# Patient Record
Sex: MALE | Race: WHITE | NOT HISPANIC OR LATINO
[De-identification: names, ages, dates, MRNs, and addresses within clinical notes are randomized per-mention and may not be internally consistent; named-entity substitution may affect disease eponyms.]

---

## 2017-02-10 ENCOUNTER — TRANSCRIPTION ENCOUNTER (OUTPATIENT)
Age: 62
End: 2017-02-10

## 2019-10-17 ENCOUNTER — TRANSCRIPTION ENCOUNTER (OUTPATIENT)
Age: 64
End: 2019-10-17

## 2021-08-12 ENCOUNTER — EMERGENCY (EMERGENCY)
Facility: HOSPITAL | Age: 66
LOS: 0 days | Discharge: HOME | End: 2021-08-13
Attending: EMERGENCY MEDICINE | Admitting: EMERGENCY MEDICINE
Payer: MEDICARE

## 2021-08-12 VITALS
OXYGEN SATURATION: 100 % | TEMPERATURE: 99 F | SYSTOLIC BLOOD PRESSURE: 174 MMHG | RESPIRATION RATE: 18 BRPM | HEART RATE: 65 BPM | DIASTOLIC BLOOD PRESSURE: 78 MMHG

## 2021-08-12 VITALS
HEART RATE: 77 BPM | OXYGEN SATURATION: 97 % | DIASTOLIC BLOOD PRESSURE: 77 MMHG | SYSTOLIC BLOOD PRESSURE: 168 MMHG | RESPIRATION RATE: 17 BRPM

## 2021-08-12 DIAGNOSIS — E11.9 TYPE 2 DIABETES MELLITUS WITHOUT COMPLICATIONS: ICD-10-CM

## 2021-08-12 DIAGNOSIS — I10 ESSENTIAL (PRIMARY) HYPERTENSION: ICD-10-CM

## 2021-08-12 DIAGNOSIS — R51.9 HEADACHE, UNSPECIFIED: ICD-10-CM

## 2021-08-12 DIAGNOSIS — R11.0 NAUSEA: ICD-10-CM

## 2021-08-12 DIAGNOSIS — E78.5 HYPERLIPIDEMIA, UNSPECIFIED: ICD-10-CM

## 2021-08-12 DIAGNOSIS — G44.89 OTHER HEADACHE SYNDROME: ICD-10-CM

## 2021-08-12 PROCEDURE — 99284 EMERGENCY DEPT VISIT MOD MDM: CPT

## 2021-08-12 PROCEDURE — 70450 CT HEAD/BRAIN W/O DYE: CPT | Mod: 26,MA

## 2021-08-12 PROCEDURE — 93010 ELECTROCARDIOGRAM REPORT: CPT

## 2021-08-12 RX ORDER — ACETAMINOPHEN 500 MG
975 TABLET ORAL ONCE
Refills: 0 | Status: COMPLETED | OUTPATIENT
Start: 2021-08-12 | End: 2021-08-12

## 2021-08-12 RX ORDER — LOSARTAN POTASSIUM 100 MG/1
50 TABLET, FILM COATED ORAL ONCE
Refills: 0 | Status: COMPLETED | OUTPATIENT
Start: 2021-08-12 | End: 2021-08-12

## 2021-08-12 RX ADMIN — Medication 975 MILLIGRAM(S): at 22:32

## 2021-08-12 RX ADMIN — LOSARTAN POTASSIUM 50 MILLIGRAM(S): 100 TABLET, FILM COATED ORAL at 23:57

## 2021-08-12 NOTE — ED PROVIDER NOTE - NS ED ROS FT
Constitutional: no fever, chills, no recent weight loss, change in appetite or malaise  Eyes: no redness/discharge/pain/vision changes  ENT: no rhinorrhea/ear pain/sore throat  Cardiac: No chest pain, SOB or edema.  Respiratory: No cough or respiratory distress  GI: No nausea, vomiting, diarrhea or abdominal pain.  : No dysuria, frequency, urgency or hematuria  MS: no pain to back or extremities, no loss of ROM, no weakness  Neuro: + Headache No weakness. No LOC.  Skin: No skin rash.  Endocrine: + diabetes.

## 2021-08-12 NOTE — ED PROVIDER NOTE - NSFOLLOWUPINSTRUCTIONS_ED_ALL_ED_FT
Headache    A headache is pain or discomfort felt around the head or neck area. The specific cause of a headache may not be found as there are many types including tension headaches, migraine headaches, and cluster headaches. Watch your condition for any changes. Things you can do to manage your pain include taking over the counter and prescription medications as instructed by your health care provider, lying down in a dark quiet room, limiting stress, getting regular sleep, and refraining from alcohol and tobacco products.    SEEK IMMEDIATE MEDICAL CARE IF YOU EXPERIENCE THE FOLLOWING SYMPTOMS: fever, vomiting, stiff neck, loss of vision, problems with speech, muscle weakness, loss of balance, trouble walking, pass out, or confusion.     Hypertension    Hypertension, commonly called high blood pressure, is when the force of blood pumping through your arteries is too strong. Hypertension forces your heart to work harder to pump blood. Your arteries may become narrow or stiff. Having untreated or uncontrolled hypertension for a long period of time can cause heart attack, stroke, kidney disease, and other problems. If started on a medication, take exactly as prescribed by your health care professional. Maintain a healthy lifestyle and follow up with your primary care physician.    SEEK IMMEDIATE MEDICAL CARE IF YOU HAVE THE FOLLOWING SYMPTOMS: severe headache, confusion, chest pain, abdominal pain, vomiting, or shortness of breath.

## 2021-08-12 NOTE — ED PROVIDER NOTE - PATIENT PORTAL LINK FT
You can access the FollowMyHealth Patient Portal offered by [facility] by registering at the following website: [email or web address]. By joining [facility]’s FollowMyHealth portal, you will also be able to view your health information using other applications (apps) compatible with our system.

## 2021-08-12 NOTE — ED PROVIDER NOTE - CLINICAL SUMMARY MEDICAL DECISION MAKING FREE TEXT BOX
Patient presents with headache and high blood pressure. ct and ekg done. no acute findings. Home BP medication give with improvement in pressure and symptoms. Discharged with pmd follow up and return precautions.

## 2021-08-12 NOTE — ED PROVIDER NOTE - CARE PROVIDER_API CALL
Anni Morgan  INTERNAL MEDICINE  [facility] [place] [address]  Phone: [phone]  Fax: [phone]  Follow Up Time:

## 2021-08-12 NOTE — ED PROVIDER NOTE - ATTENDING CONTRIBUTION TO CARE
67 yo M pmh of HTN, HLD, DM presents with headache and high blood pressure. States that it started around 5pm, gradual onset, pressure like in the front of his head. Went to urgent care center and was found to be hypertensive so was sent ot the ED for further management. no numbness, tingling or weakness. no chest pain, no shortness of breath, no palpitations. Patient missed his pmd dose of losartan. Does report some nausea, no vomiting. pmd is Dr. Navarro.     CONSTITUTIONAL: Well-developed; well-nourished; in no acute distress.   SKIN: warm, dry  HEAD: Normocephalic; atraumatic.  EYES: PERRL, EOMI, no conjunctival erythema  ENT: No nasal discharge; airway clear.  NECK: Supple; non tender.  CARD: S1, S2 normal;  Regular rate and rhythm.   RESP: No wheezes, rales or rhonchi.  ABD: soft non tender, non distended, no rebound or guarding  EXT: Normal ROM.  5/5 strength in all 4 extremities   LYMPH: No acute cervical adenopathy.  NEURO: Alert, oriented, grossly unremarkable. neurovascularly intact  PSYCH: Cooperative, appropriate.

## 2021-08-12 NOTE — ED PROVIDER NOTE - OBJECTIVE STATEMENT
67 yo male hx of HTN/HLD/DM present c/o gradual onset of frontal headache started around 8pm while he was watching TV. headache is mild to moderate and localized to the front of his head. checked his BP at home and it was 200s/100s so he went to outside [facility] for COVID swab and referred to ED for HTN evaluation. denies nausea/vomiting assoc with headache. also reported he hasn't taken his BP med (losartan) tonight. Denies Dizziness/slur speech/extremities weakness and numbness/ facial numbness and weakness. Denies Fever/chill/recent illness/coughing/chest pain/sob/abd pain/n/v/d/extremities pain/urinary sxs.

## 2021-08-12 NOTE — ED ADULT TRIAGE NOTE - BP NONINVASIVE SYSTOLIC (MM HG)
Encounter addended by: Allyssa Martinez OT on: 9/27/2018 11:06 AM<BR>     Actions taken: Flowsheet accepted [phone]

## 2021-08-13 NOTE — ED ADULT NURSE NOTE - TEMPLATE LIST FOR HEAD TO TOE ASSESSMENT
1. Name: Keyur Olivares MRN #: [id number]  2. Date: 5/3/2018  Procedure: left knee arthroscopy, medial meniscus debridement.  3. Discharge to home when stable, tolerating clear liquids, and patient has urinated  4. Call for follow-up appointment, [phone], with Dr. Menchaca in:  2 weeks.   WOUND CARE    The bandage may be slightly bloody. This is normal.  5. Ice:  Keep an ice bag on your knee for 20 minutes at a time.  6. Keep incisions clean and dry following surgery for:  72 hours   7. Change all bandages in:  72 hours       8. If bandages are changed before follow-up, cover all incisions with fresh bandages or bandaids.  9. O.K. to shower (may get incision wet) in:  72 hours  10. No tub baths, swimming pools, hot tubs, etc. for a minimum of 2 weeks following surgery  ACTIVITY  11. Keep leg elevated on a pillow placed under ankle. Do not keep pillow under your knee.  12. Weight-bearing ([place]):  Weight-bear as tolerated       May discontinue crutches in 2-3 days if able to walk without a limp.  13. Bracing: no brace needed unless per comfort.l  14. Range of motion limits: no limit. Work on regaining full range of motion.  15. Exercises:  Perform exercises 3 times a day for a minimum of 25 reps each time (start today or tomorrow):             Quadriceps sets  Calf Pumps Straight leg raises  Heels Slides   16. Start Physical Therapy: will discuss upon return to clinic.  17. OK to drive:  Not for 24 hours    When going back to driving, be sure to test braking/acceleration maneuvers in an empty parking lot before entry into any traffic areas.      ABSOLUTELY NO DRIVING WHILE TAKING NARCOTICS!    DISCHARGE MEDICATIONS:   Aspirin 325 mg, 1 tablet, take once a day for 14 days then stop (to prevent blood clots) (over the counter)  Norco (5/325), 1 to 2 tablets, take every 4 to 6 hours as needed for pain, do not exceed 12 tabs/day  Other: stool softeners  Over the counter ibuprofen as needed, per package  Cardiac instructions    Strong pain medication has been prescribed. Use as directed. Do not combine with alcohol. Be careful as you walk or climb stairs.   DIET:  If no nausea, clear liquids should be taken initially.  Then progress to solid foods when clear liquids are tolerated.   RESPONSE TO SURGERY: It is normal to have pain and swelling in your knee after surgery. It may take 4 weeks or longer for the swelling to go away. It is also common to notice some bruising around the knee, thigh, and calf as the swelling resolves.  EMERGENCY: Call or return for any fevers (temperature greater than 101.5   or sustained fevers greater than 100.5   that haven t resolved within 3 to 4 days following surgery) or chills, increasing pain, swelling, redness, calf pain, drainage (especially if yellow, green, or foul smelling), excessive bleeding), chest pain, shortness of breath:  Phone #: [phone]; If emergency, go to local ER or dial 911.    Dustin Menchaca M.D., M.S.  Dept. of Orthopaedic Surgery  [facility]    5/3/2018        KNEE SURGERY - HOME EXERCISE PROGRAM    All exercises to be performed at least 3 times per day.     Quad Sets    Sit with leg extended    Tighten quad muscles in front of leg, trying to  push back of knee downward    Hold exercise for 10 seconds    Rest 10 seconds between reps    Perform 1 set of 20 reps, 3 times a day     Heel Slides     Lie on back with legs straight    Slide heel to buttocks     Return to start position    Repeat with other leg    Perform 1 rep every 4 seconds    Perform 3 sets of 20 reps, 3 times a day    Rest 1 minute between sets     Ankle Pumps     Lie on back with foot elevated on pillow    Move foot up and down, pumping ankle    Perform 3 sets of 20 reps, 3 times a day    Perform 1 rep every 4 seconds    Rest 1 minute between sets     Straight Leg Raise    Lie on back with uninvolved knee bent    Raise straight leg to thigh level of bend leg    Return to starting  position    Perform 3 sets of 20 reps, 3 times a day    Perform 1 rep every 4 seconds    Rest 1 minute between sets     Tylenol was given at 6:30 am.    No Ibuprofen before 7:45 am.    To contact Dr Menchaca call:  [phone]    [facility]  Same-Day Surgery   Adult Discharge Orders & Instructions   For 24 hours after surgery  1. Get plenty of rest.  A responsible adult must stay with you for at least 24 hours after you leave the hospital.   2. Do not drive or use heavy equipment.  If you have weakness or tingling, don't drive or use heavy equipment until this feeling goes away.  3. Do not drink alcohol.  4. Avoid strenuous or risky activities.  Ask for help when climbing stairs.   5. You may feel lightheaded.  IF so, sit for a few minutes before standing.  Have someone help you get up.   6. If you have nausea (feel sick to your stomach): Drink only clear liquids such as apple juice, ginger ale, broth or 7-Up.  Rest may also help.  Be sure to drink enough fluids.  Move to a regular diet as you feel able.  7. You may have a slight fever. Call the doctor if your fever is over 100 F (37.7 C) (taken under the tongue) or lasts longer than 24 hours.  8. You may have a dry mouth, a sore throat, muscle aches or trouble sleeping.  These should go away after 24 hours.  9. Do not make important or legal decisions.   Call your doctor for any of the followin.  Signs of infection (fever, growing tenderness at the surgery site, a large amount of drainage or bleeding, severe pain, foul-smelling drainage, redness, swelling).    2. It has been over 8 to 10 hours since surgery and you are still not able to urinate (pass water).    3.  Headache for over 24 hours.

## 2022-10-26 PROBLEM — I10 ESSENTIAL (PRIMARY) HYPERTENSION: Chronic | Status: ACTIVE | Noted: 2021-08-13

## 2022-10-26 PROBLEM — E78.5 HYPERLIPIDEMIA, UNSPECIFIED: Chronic | Status: ACTIVE | Noted: 2021-08-13

## 2022-10-26 PROBLEM — E11.9 TYPE 2 DIABETES MELLITUS WITHOUT COMPLICATIONS: Chronic | Status: ACTIVE | Noted: 2021-08-13

## 2022-11-02 ENCOUNTER — OUTPATIENT (OUTPATIENT)
Dept: OUTPATIENT SERVICES | Facility: HOSPITAL | Age: 67
LOS: 1 days | Discharge: HOME | End: 2022-11-02

## 2022-11-02 VITALS
DIASTOLIC BLOOD PRESSURE: 75 MMHG | HEART RATE: 94 BPM | WEIGHT: 315 LBS | OXYGEN SATURATION: 100 % | HEIGHT: 71 IN | SYSTOLIC BLOOD PRESSURE: 154 MMHG | RESPIRATION RATE: 18 BRPM | TEMPERATURE: 98 F

## 2022-11-02 VITALS — RESPIRATION RATE: 15 BRPM | SYSTOLIC BLOOD PRESSURE: 132 MMHG | HEART RATE: 77 BPM | DIASTOLIC BLOOD PRESSURE: 63 MMHG

## 2022-11-02 DIAGNOSIS — Z90.49 ACQUIRED ABSENCE OF OTHER SPECIFIED PARTS OF DIGESTIVE TRACT: Chronic | ICD-10-CM

## 2022-11-02 LAB — GLUCOSE BLDC GLUCOMTR-MCNC: 195 MG/DL — HIGH (ref 70–99)

## 2022-11-02 RX ORDER — FENOFIBRATE,MICRONIZED 130 MG
1 CAPSULE ORAL
Qty: 0 | Refills: 0 | DISCHARGE

## 2022-11-02 RX ORDER — GLIMEPIRIDE 1 MG
1 TABLET ORAL
Qty: 0 | Refills: 0 | DISCHARGE

## 2022-11-02 NOTE — ASU PATIENT PROFILE, ADULT - FALL HARM RISK - HARM RISK INTERVENTIONS

## 2022-11-02 NOTE — PRE-ANESTHESIA EVALUATION ADULT - NSANTHALCOHOLSD_GEN_ALL_CORE
Health Maintenance Due   Topic Date Due   • COVID-19 Vaccine (1) Never done   • Influenza Vaccine (1) 09/01/2021   • Depression Screening  10/06/2021       Patient is due for the topics as listed above and wishes to proceed with them. Orders placed for Immunization(s) Influenza.    Records for the covid shot requested          Yes

## 2022-11-07 DIAGNOSIS — E78.5 HYPERLIPIDEMIA, UNSPECIFIED: ICD-10-CM

## 2022-11-07 DIAGNOSIS — Z98.84 BARIATRIC SURGERY STATUS: ICD-10-CM

## 2022-11-07 DIAGNOSIS — G47.33 OBSTRUCTIVE SLEEP APNEA (ADULT) (PEDIATRIC): ICD-10-CM

## 2022-11-07 DIAGNOSIS — I10 ESSENTIAL (PRIMARY) HYPERTENSION: ICD-10-CM

## 2022-11-07 DIAGNOSIS — E66.9 OBESITY, UNSPECIFIED: ICD-10-CM

## 2022-11-07 DIAGNOSIS — Z90.49 ACQUIRED ABSENCE OF OTHER SPECIFIED PARTS OF DIGESTIVE TRACT: ICD-10-CM

## 2022-11-07 DIAGNOSIS — H26.9 UNSPECIFIED CATARACT: ICD-10-CM

## 2022-11-07 DIAGNOSIS — E11.36 TYPE 2 DIABETES MELLITUS WITH DIABETIC CATARACT: ICD-10-CM

## 2022-11-07 DIAGNOSIS — Z79.85 LONG-TERM (CURRENT) USE OF INJECTABLE NON-INSULIN ANTIDIABETIC DRUGS: ICD-10-CM

## 2024-04-25 ENCOUNTER — APPOINTMENT (OUTPATIENT)
Dept: ORTHOPEDIC SURGERY | Facility: CLINIC | Age: 69
End: 2024-04-25
Payer: COMMERCIAL

## 2024-04-25 DIAGNOSIS — M17.0 BILATERAL PRIMARY OSTEOARTHRITIS OF KNEE: ICD-10-CM

## 2024-04-25 DIAGNOSIS — M25.50 PAIN IN UNSPECIFIED JOINT: ICD-10-CM

## 2024-04-25 PROCEDURE — 73560 X-RAY EXAM OF KNEE 1 OR 2: CPT | Mod: 50

## 2024-04-25 PROCEDURE — 99203 OFFICE O/P NEW LOW 30 MIN: CPT | Mod: 25

## 2024-04-25 PROCEDURE — 20610 DRAIN/INJ JOINT/BURSA W/O US: CPT | Mod: RT

## 2024-04-25 NOTE — HISTORY OF PRESENT ILLNESS
[de-identified] : 69-year-old gentleman presents his office for severe bilateral knee arthritis pain.  He has had pain for years.  He walks with a cane and is limited to one half blocks.  He has had hyaluronic acid injections which have not meaningfully helped his pain.  He takes 800 mg ibuprofen twice daily which mitigates but does not alleviate the pain.  No history of trauma he has lived a rigorous athletic lifestyle in the past.  He admits that he is a little overweight.  His major complaint is pain not instability.  Past medical history: Hyperlipidemia Non-insulin-dependent diabetes last hemoglobin A1c reportedly 7 Hypertension Heart murmur  Medications: Losartan Amlodipine Pravastatin Glimepiride Fenofibrate Trulicity  NKDA  Social: Retired M[employer] , walks with cane, , no cigarettes, social EtOH

## 2024-04-25 NOTE — ASSESSMENT
[FreeTextEntry1] : Non-insulin-dependent diabetic well-controlled diabetes overweight/obese (BMI calculated 47 with reported height of 511 and weight of 340 pounds) with moderate to severe bilateral knee arthritis about the medial and anterior compartments of the knee.  I had a long discussion about treatment options.  I do think he would benefit from physical therapy possibly a cortisone injection and some weight loss.  I have strongly suggested that he follow-up with the bariatric service.  I would be unwilling to do knee replacement on this patient until his BMI is below 40.  Will see him back in the office in 3 to 6 months time to see if he has made improvements in his weight loss and whether therapy and the injection of help with his pain.  He is amenable to these recommendations.  All questions answered to his satisfaction.  He would like to proceed with a cortisone injection is more severe right knee.  Procedure: With the patient's consent and at his request utilizing standard sterile technique patient was provided an injection of lidocaine 1% (4 cc), Marcaine 0.25% (4 cc) and Depo-Medrol 5 mcg/cc (2 cc) into the right knee through a anterior medial portal.  Patient injection without issue.  Postinjection precautions were discussed.

## 2024-04-25 NOTE — IMAGING
[de-identified] : Overweight gentleman walks into my office with mild antalgia.  He is able to get onto and off of the exam table without assistance but uncomfortably.  He is an antalgic gait.  Physical examination: Bilateral hips: Habitus obscures exam somewhat.  No discrete lymph nodes or masses inguinal crease.  No tenderness of the trochanteric bursa.  No pain with rotation hip joint full extension 9 degrees of flexion.  Bilateral knees: Again habitus obscures examination.  Tenderness is primarily along the medial joint line.  Somewhat abnormal patellofemoral grind test.  Synovial thickening.  No gross effusion.  Knee motion restricted to 0-90 degrees.  No varus valgus instability.  Negative Lachman test.  No thrust.  Calf soft no cords.  Bilateral weightbearing knees (AP, lateral): Advanced arthritic changes with loss of the medial joint space.  Marginal osteophytes are noted laterally, posteriorly and anteriorly about the patellofemoral joint to a lesser extent medially.  Several loose bodies are noted.

## 2024-10-31 ENCOUNTER — APPOINTMENT (OUTPATIENT)
Dept: ORTHOPEDIC SURGERY | Facility: CLINIC | Age: 69
End: 2024-10-31
Payer: MEDICARE

## 2024-10-31 DIAGNOSIS — M17.0 BILATERAL PRIMARY OSTEOARTHRITIS OF KNEE: ICD-10-CM

## 2024-10-31 PROCEDURE — 20610 DRAIN/INJ JOINT/BURSA W/O US: CPT | Mod: 50

## 2024-10-31 PROCEDURE — 99203 OFFICE O/P NEW LOW 30 MIN: CPT | Mod: 25

## 2024-11-22 ENCOUNTER — NON-APPOINTMENT (OUTPATIENT)
Age: 69
End: 2024-11-22

## 2024-11-22 ENCOUNTER — APPOINTMENT (OUTPATIENT)
Facility: CLINIC | Age: 69
End: 2024-11-22

## 2024-11-22 PROCEDURE — 99204 OFFICE O/P NEW MOD 45 MIN: CPT

## 2024-11-22 PROCEDURE — 92134 CPTRZ OPH DX IMG PST SGM RTA: CPT

## 2024-11-26 ENCOUNTER — APPOINTMENT (OUTPATIENT)
Dept: PULMONOLOGY | Facility: CLINIC | Age: 69
End: 2024-11-26

## 2024-11-30 ENCOUNTER — INPATIENT (INPATIENT)
Facility: HOSPITAL | Age: 69
LOS: 2 days | Discharge: HOME | DRG: 69 | End: 2024-12-03
Attending: INTERNAL MEDICINE | Admitting: FAMILY MEDICINE
Payer: MEDICARE

## 2024-11-30 VITALS
TEMPERATURE: 98 F | RESPIRATION RATE: 18 BRPM | WEIGHT: 315 LBS | DIASTOLIC BLOOD PRESSURE: 70 MMHG | OXYGEN SATURATION: 99 % | HEART RATE: 64 BPM | SYSTOLIC BLOOD PRESSURE: 170 MMHG

## 2024-11-30 DIAGNOSIS — G45.9 TRANSIENT CEREBRAL ISCHEMIC ATTACK, UNSPECIFIED: ICD-10-CM

## 2024-11-30 DIAGNOSIS — Z90.49 ACQUIRED ABSENCE OF OTHER SPECIFIED PARTS OF DIGESTIVE TRACT: Chronic | ICD-10-CM

## 2024-11-30 LAB
ALBUMIN SERPL ELPH-MCNC: 4.5 G/DL — SIGNIFICANT CHANGE UP (ref 3.5–5.2)
ALP SERPL-CCNC: 38 U/L — SIGNIFICANT CHANGE UP (ref 30–115)
ALT FLD-CCNC: 15 U/L — SIGNIFICANT CHANGE UP (ref 0–41)
ANION GAP SERPL CALC-SCNC: 11 MMOL/L — SIGNIFICANT CHANGE UP (ref 7–14)
APTT BLD: 31.6 SEC — SIGNIFICANT CHANGE UP (ref 27–39.2)
AST SERPL-CCNC: 20 U/L — SIGNIFICANT CHANGE UP (ref 0–41)
BASOPHILS # BLD AUTO: 0.04 K/UL — SIGNIFICANT CHANGE UP (ref 0–0.2)
BASOPHILS NFR BLD AUTO: 0.5 % — SIGNIFICANT CHANGE UP (ref 0–1)
BILIRUB SERPL-MCNC: 0.5 MG/DL — SIGNIFICANT CHANGE UP (ref 0.2–1.2)
BUN SERPL-MCNC: 20 MG/DL — SIGNIFICANT CHANGE UP (ref 10–20)
CALCIUM SERPL-MCNC: 10 MG/DL — SIGNIFICANT CHANGE UP (ref 8.4–10.5)
CHLORIDE SERPL-SCNC: 99 MMOL/L — SIGNIFICANT CHANGE UP (ref 98–110)
CO2 SERPL-SCNC: 25 MMOL/L — SIGNIFICANT CHANGE UP (ref 17–32)
CREAT SERPL-MCNC: 0.9 MG/DL — SIGNIFICANT CHANGE UP (ref 0.7–1.5)
EGFR: 93 ML/MIN/1.73M2 — SIGNIFICANT CHANGE UP
EOSINOPHIL # BLD AUTO: 0.14 K/UL — SIGNIFICANT CHANGE UP (ref 0–0.7)
EOSINOPHIL NFR BLD AUTO: 1.7 % — SIGNIFICANT CHANGE UP (ref 0–8)
FLUAV AG NPH QL: SIGNIFICANT CHANGE UP
FLUBV AG NPH QL: SIGNIFICANT CHANGE UP
GLUCOSE BLDC GLUCOMTR-MCNC: 130 MG/DL — HIGH (ref 70–99)
GLUCOSE BLDC GLUCOMTR-MCNC: 133 MG/DL — HIGH (ref 70–99)
GLUCOSE SERPL-MCNC: 129 MG/DL — HIGH (ref 70–99)
HCT VFR BLD CALC: 39.6 % — LOW (ref 42–52)
HGB BLD-MCNC: 13.7 G/DL — LOW (ref 14–18)
IMM GRANULOCYTES NFR BLD AUTO: 1.4 % — HIGH (ref 0.1–0.3)
INR BLD: 0.98 RATIO — SIGNIFICANT CHANGE UP (ref 0.65–1.3)
LYMPHOCYTES # BLD AUTO: 1.44 K/UL — SIGNIFICANT CHANGE UP (ref 1.2–3.4)
LYMPHOCYTES # BLD AUTO: 17 % — LOW (ref 20.5–51.1)
MCHC RBC-ENTMCNC: 30.8 PG — SIGNIFICANT CHANGE UP (ref 27–31)
MCHC RBC-ENTMCNC: 34.6 G/DL — SIGNIFICANT CHANGE UP (ref 32–37)
MCV RBC AUTO: 89 FL — SIGNIFICANT CHANGE UP (ref 80–94)
MONOCYTES # BLD AUTO: 0.68 K/UL — HIGH (ref 0.1–0.6)
MONOCYTES NFR BLD AUTO: 8 % — SIGNIFICANT CHANGE UP (ref 1.7–9.3)
NEUTROPHILS # BLD AUTO: 6.05 K/UL — SIGNIFICANT CHANGE UP (ref 1.4–6.5)
NEUTROPHILS NFR BLD AUTO: 71.4 % — SIGNIFICANT CHANGE UP (ref 42.2–75.2)
NRBC # BLD: 0 /100 WBCS — SIGNIFICANT CHANGE UP (ref 0–0)
PLATELET # BLD AUTO: 260 K/UL — SIGNIFICANT CHANGE UP (ref 130–400)
PMV BLD: 9.6 FL — SIGNIFICANT CHANGE UP (ref 7.4–10.4)
POTASSIUM SERPL-MCNC: 4.2 MMOL/L — SIGNIFICANT CHANGE UP (ref 3.5–5)
POTASSIUM SERPL-SCNC: 4.2 MMOL/L — SIGNIFICANT CHANGE UP (ref 3.5–5)
PROT SERPL-MCNC: 6.9 G/DL — SIGNIFICANT CHANGE UP (ref 6–8)
PROTHROM AB SERPL-ACNC: 11.5 SEC — SIGNIFICANT CHANGE UP (ref 9.95–12.87)
RBC # BLD: 4.45 M/UL — LOW (ref 4.7–6.1)
RBC # FLD: 12.9 % — SIGNIFICANT CHANGE UP (ref 11.5–14.5)
RSV RNA NPH QL NAA+NON-PROBE: SIGNIFICANT CHANGE UP
SARS-COV-2 RNA SPEC QL NAA+PROBE: SIGNIFICANT CHANGE UP
SODIUM SERPL-SCNC: 135 MMOL/L — SIGNIFICANT CHANGE UP (ref 135–146)
TROPONIN T, HIGH SENSITIVITY RESULT: 34 NG/L — HIGH (ref 6–21)
WBC # BLD: 8.47 K/UL — SIGNIFICANT CHANGE UP (ref 4.8–10.8)
WBC # FLD AUTO: 8.47 K/UL — SIGNIFICANT CHANGE UP (ref 4.8–10.8)

## 2024-11-30 PROCEDURE — 70498 CT ANGIOGRAPHY NECK: CPT | Mod: 26,MC

## 2024-11-30 PROCEDURE — 82962 GLUCOSE BLOOD TEST: CPT

## 2024-11-30 PROCEDURE — 97162 PT EVAL MOD COMPLEX 30 MIN: CPT | Mod: GP

## 2024-11-30 PROCEDURE — 82607 VITAMIN B-12: CPT

## 2024-11-30 PROCEDURE — 80053 COMPREHEN METABOLIC PANEL: CPT

## 2024-11-30 PROCEDURE — 97110 THERAPEUTIC EXERCISES: CPT | Mod: GP

## 2024-11-30 PROCEDURE — 0042T: CPT

## 2024-11-30 PROCEDURE — 71045 X-RAY EXAM CHEST 1 VIEW: CPT | Mod: 26

## 2024-11-30 PROCEDURE — 70450 CT HEAD/BRAIN W/O DYE: CPT | Mod: MC

## 2024-11-30 PROCEDURE — 97116 GAIT TRAINING THERAPY: CPT | Mod: GP

## 2024-11-30 PROCEDURE — 70496 CT ANGIOGRAPHY HEAD: CPT | Mod: 26,MC

## 2024-11-30 PROCEDURE — 70450 CT HEAD/BRAIN W/O DYE: CPT | Mod: 26,XU,MC

## 2024-11-30 PROCEDURE — 97165 OT EVAL LOW COMPLEX 30 MIN: CPT | Mod: GO

## 2024-11-30 PROCEDURE — 99285 EMERGENCY DEPT VISIT HI MDM: CPT

## 2024-11-30 PROCEDURE — 85025 COMPLETE CBC W/AUTO DIFF WBC: CPT

## 2024-11-30 PROCEDURE — 84100 ASSAY OF PHOSPHORUS: CPT

## 2024-11-30 PROCEDURE — 83735 ASSAY OF MAGNESIUM: CPT

## 2024-11-30 PROCEDURE — 93306 TTE W/DOPPLER COMPLETE: CPT

## 2024-11-30 PROCEDURE — 93010 ELECTROCARDIOGRAM REPORT: CPT

## 2024-11-30 PROCEDURE — 84443 ASSAY THYROID STIM HORMONE: CPT

## 2024-11-30 PROCEDURE — 36415 COLL VENOUS BLD VENIPUNCTURE: CPT

## 2024-11-30 PROCEDURE — 80061 LIPID PANEL: CPT

## 2024-11-30 PROCEDURE — 85027 COMPLETE CBC AUTOMATED: CPT

## 2024-11-30 RX ORDER — SENNOSIDES 8.6 MG
2 TABLET ORAL AT BEDTIME
Refills: 0 | Status: DISCONTINUED | OUTPATIENT
Start: 2024-11-30 | End: 2024-12-03

## 2024-11-30 RX ORDER — GLIMEPIRIDE 1 MG/1
1 TABLET ORAL
Refills: 0 | DISCHARGE

## 2024-11-30 RX ORDER — INFLUENZA VIRUS VACCINE 15; 15; 15; 15 UG/.5ML; UG/.5ML; UG/.5ML; UG/.5ML
0.5 SUSPENSION INTRAMUSCULAR ONCE
Refills: 0 | Status: DISCONTINUED | OUTPATIENT
Start: 2024-11-30 | End: 2024-12-03

## 2024-11-30 RX ORDER — CLOPIDOGREL 75 MG/1
300 TABLET, FILM COATED ORAL ONCE
Refills: 0 | Status: COMPLETED | OUTPATIENT
Start: 2024-11-30 | End: 2024-11-30

## 2024-11-30 RX ORDER — TAMSULOSIN HYDROCHLORIDE 0.4 MG/1
0.4 CAPSULE ORAL AT BEDTIME
Refills: 0 | Status: DISCONTINUED | OUTPATIENT
Start: 2024-11-30 | End: 2024-12-03

## 2024-11-30 RX ORDER — FENOFIBRATE,MICRONIZED 134 MG
145 CAPSULE ORAL DAILY
Refills: 0 | Status: DISCONTINUED | OUTPATIENT
Start: 2024-11-30 | End: 2024-12-03

## 2024-11-30 RX ORDER — NIFEDIPINE 10 MG
30 CAPSULE ORAL DAILY
Refills: 0 | Status: DISCONTINUED | OUTPATIENT
Start: 2024-11-30 | End: 2024-12-03

## 2024-11-30 RX ORDER — FENOFIBRATE,MICRONIZED 134 MG
160 CAPSULE ORAL DAILY
Refills: 0 | Status: DISCONTINUED | OUTPATIENT
Start: 2024-11-30 | End: 2024-11-30

## 2024-11-30 RX ORDER — TAMSULOSIN HYDROCHLORIDE 0.4 MG/1
1 CAPSULE ORAL
Refills: 0 | DISCHARGE

## 2024-11-30 RX ORDER — 0.9 % SODIUM CHLORIDE 0.9 %
1000 INTRAVENOUS SOLUTION INTRAVENOUS
Refills: 0 | Status: DISCONTINUED | OUTPATIENT
Start: 2024-11-30 | End: 2024-12-03

## 2024-11-30 RX ORDER — ORAL SEMAGLUTIDE 7 MG/1
0.5 TABLET ORAL
Refills: 0 | DISCHARGE

## 2024-11-30 RX ORDER — FENOFIBRATE,MICRONIZED 134 MG
1 CAPSULE ORAL
Refills: 0 | DISCHARGE

## 2024-11-30 RX ORDER — AMLODIPINE BESYLATE 10 MG/1
10 TABLET ORAL DAILY
Refills: 0 | Status: DISCONTINUED | OUTPATIENT
Start: 2024-11-30 | End: 2024-12-03

## 2024-11-30 RX ORDER — GLUCAGON INJECTION, SOLUTION 0.5 MG/.1ML
1 INJECTION, SOLUTION SUBCUTANEOUS ONCE
Refills: 0 | Status: DISCONTINUED | OUTPATIENT
Start: 2024-11-30 | End: 2024-12-03

## 2024-11-30 RX ORDER — ENOXAPARIN SODIUM 30 MG/.3ML
40 INJECTION SUBCUTANEOUS EVERY 24 HOURS
Refills: 0 | Status: DISCONTINUED | OUTPATIENT
Start: 2024-11-30 | End: 2024-12-03

## 2024-11-30 RX ORDER — AMLODIPINE BESYLATE 10 MG/1
1 TABLET ORAL
Refills: 0 | DISCHARGE

## 2024-11-30 RX ADMIN — Medication 10 MILLIGRAM(S): at 21:22

## 2024-11-30 RX ADMIN — AMLODIPINE BESYLATE 10 MILLIGRAM(S): 10 TABLET ORAL at 20:31

## 2024-11-30 RX ADMIN — CLOPIDOGREL 300 MILLIGRAM(S): 75 TABLET, FILM COATED ORAL at 19:12

## 2024-11-30 RX ADMIN — Medication 325 MILLIGRAM(S): at 19:11

## 2024-11-30 RX ADMIN — TAMSULOSIN HYDROCHLORIDE 0.4 MILLIGRAM(S): 0.4 CAPSULE ORAL at 21:23

## 2024-11-30 NOTE — H&P ADULT - NSHPLABSRESULTS_GEN_ALL_CORE
LABS:                          13.7   8.47  )-----------( 260      ( 30 Nov 2024 15:35 )             39.6     11-30    135  |  99  |  20  ----------------------------<  129[H]  4.2   |  25  |  0.9    Ca    10.0      30 Nov 2024 15:35    TPro  6.9  /  Alb  4.5  /  TBili  0.5  /  DBili  x   /  AST  20  /  ALT  15  /  AlkPhos  38  11-30    LIVER FUNCTIONS - ( 30 Nov 2024 15:35 )  Alb: 4.5 g/dL / Pro: 6.9 g/dL / ALK PHOS: 38 U/L / ALT: 15 U/L / AST: 20 U/L / GGT: x           PT/INR - ( 30 Nov 2024 15:35 )   PT: 11.50 sec;   INR: 0.98 ratio         PTT - ( 30 Nov 2024 15:35 )  PTT:31.6 sec  Urinalysis Basic - ( 30 Nov 2024 15:35 )    Color: x / Appearance: x / SG: x / pH: x  Gluc: 129 mg/dL / Ketone: x  / Bili: x / Urobili: x   Blood: x / Protein: x / Nitrite: x   Leuk Esterase: x / RBC: x / WBC x   Sq Epi: x / Non Sq Epi: x / Bacteria: x

## 2024-11-30 NOTE — H&P ADULT - ASSESSMENT
69 year old male past medical history of Diabetic neuropathy, Diabetes, Hypertension, HLD presenting for headache, generalized weakness and body aches x2 days.  Patient endorses b/l numbness and tingling sensation, right arm weakness during the episode this morning. Wife says he was having speaking difficulty and looked and his left side of face was slightly deviated.  Examination benign on my assessment. Although high risk for stroke given multiple comorbid condition, 24-48 hour monitoring is beneficial.       Assessment and plan:    # TIA   # severe carotid stenosis  # severe vertebral stenosis  # morbid obesity on ozempic  # DM   # HTN  #DLD  # Immunocompromised(Age >60, DM )       ED vitals: BP: 170/70  HR: 64  RR: 18  Temp:afebrile   Labs: wbc 13.7, glucose 129 , Troponin 34 , RVP -ve      stroke work up:   No acute intracranial pathology. No evidence of midline shift, mass   effect or intracranial hemorrhage.  Mild chronic microvascular type changes.    CTA HEAD:  No evidence of flow-limiting stenosis or occlusion.  2 mm inferolateral directed outpouching along the left aspect of the   basilar artery possibly reflecting aneurysm.  CTA NECK:  Severe atherosclerotic plaque at the origin of the right internal carotid   artery resulting in severe stenosis.  Severe atherosclerotic plaque at the origin of the left vertebral artery  resulting in severe stenosis.  Focal thickening of the wall of the left external carotid artery at its   origin possibly infectious or inflammatory etiology.    Plan:  - c/w aspirin, plavix, statin   - F/u HbA1c, lipid profile and tsh in AM  - neurochecks q4h   - BP target 140-160   - neurology consult  - bedside dysphagia eval  - PT/OT consult   - Pt claustrophobic to do MRI, would prefer under anesthesia  - c/w home meds  - US carotid to determine degree of stenosis   - F/u cardiology outpatient   - Admit to SDU    DVT: lovenox, scd  diet: CC   activity as tolerated

## 2024-11-30 NOTE — ED ADULT NURSE NOTE - SUICIDE SCREENING DEPRESSION
Negative Detail Level: Zone Recommendations (Free Text): To follow up with their primary care physician yearly or as indicated.\\nNo suspicious lesions noted on skin examination. Recommendation Preamble: The following recommendations were made during the visit: Render Risk Assessment In Note?: no

## 2024-11-30 NOTE — ED ADULT NURSE NOTE - NSFALLHARMRISKINTERV_ED_ALL_ED
Assistance OOB with selected safe patient handling equipment if applicable/Communicate risk of Fall with Harm to all staff, patient, and family/Monitor gait and stability/Provide patient with walking aids/Provide visual cue: red socks, yellow wristband, yellow gown, etc/Reinforce activity limits and safety measures with patient and family/Bed in lowest position, wheels locked, appropriate side rails in place/Call bell, personal items and telephone in reach/Instruct patient to call for assistance before getting out of bed/chair/stretcher/Non-slip footwear applied when patient is off stretcher/[place] to call system/Physically safe environment - no spills, clutter or unnecessary equipment/Purposeful Proactive Rounding/Room/bathroom lighting operational, light cord in reach

## 2024-11-30 NOTE — ED PROVIDER NOTE - OBJECTIVE STATEMENT
69 year old male past medical history of Diabetes, Hypertension, HLD presenting for headache, generalized weakness and body aches x2 days. Patient states "it feels like the flu". patient additionally endorsing b/l hand/ arm tingling last week but states thi sis a chronic issue. denies any fever chills, cough, abdominal pain, nausea, recent falls, chest pain, shortness of breath. 69 year old male past medical history of Diabetes, Hypertension, HLD presenting for headache, generalized weakness and body aches x2 days. Patient states "it feels like the flu". patient additionally endorsing b/l hand/ arm tingling last week but states this is a chronic issue. denies any fever chills, cough, abdominal pain, nausea, recent falls, chest pain, shortness of breath. 69 year old male past medical history of Diabetic neuropathy, Diabetes, Hypertension, HLD presenting for headache, generalized weakness and body aches x2 days.  Patient additionally endorsing RUE hand/ arm tingling last week but states this is a chronic issue. denies any fever chills, cough, abdominal pain, nausea, recent falls, chest pain, shortness of breath. 69 year old male past medical history of Diabetic neuropathy, Diabetes, Hypertension, HLD presenting for headache, generalized weakness and body aches x2 days.  Patient additionally endorsing RUE tingling and weakness last week, and at that time his family states "it looked like he was having a stroke" due to facial asymmetry. denies any fever chills, cough, abdominal pain, nausea, recent falls, chest pain, shortness of breath. 69 year old male past medical history of Diabetic neuropathy, Diabetes, Hypertension, HLD presenting for headache, generalized weakness and body aches x2 days.  Patient additionally endorsing RUE tingling and weakness last week and on Thursday family noted that "it looked like he was having a stroke" due to facial asymmetry. denies any fever chills, cough, abdominal pain, nausea, recent falls, chest pain, shortness of breath.

## 2024-11-30 NOTE — PATIENT PROFILE ADULT - FALL HARM RISK - RISK INTERVENTIONS

## 2024-11-30 NOTE — H&P ADULT - NSHPPHYSICALEXAM_GEN_ALL_CORE
GENERAL: NAD, lying in bed comfortably  HEAD:  Atraumatic, normocephalic  EYES: EOMI, PERRL  NECK: Supple, trachea midline, no JVD  HEART: Regular rate and rhythm  LUNGS: Unlabored respirations.  Clear to auscultation bilaterally, no crackles, wheezing, or rhonchi  ABDOMEN: Soft, nontender, nondistended, +BS  EXTREMITIES: 2+ peripheral pulses bilaterally. No clubbing, cyanosis, or edema  NERVOUS SYSTEM:  A&Ox3, moving all extremities, no focal deficits  motor 5/5 all extremitites, sensory: intact, touch/propioception/vibration intact, babinski negative, arm drop test negative, no bulbar symptoms

## 2024-11-30 NOTE — ED PROVIDER NOTE - PHYSICAL EXAMINATION
VITAL SIGNS: I have reviewed nursing notes and confirm.  CONSTITUTIONAL: Well-developed; well-nourished; in no acute distress.  SKIN: Skin exam is warm and dry, no acute rash.  HEAD: Normocephalic; atraumatic.  EYES: PERRL, EOM intact; conjunctiva and sclera clear.  ENT: No nasal discharge  NECK: Supple  CARD: S1, S2 normal; no murmurs, gallops, or rubs. Regular rate and rhythm.  RESP: No wheezes, rales or rhonchi. Speaking in full sentences.   ABD: Soft; non-distended; non-tender; No rebound or guarding.   EXT: Normal ROM. No clubbing, cyanosis or edema.  NEURO: Alert, oriented. Grossly unremarkable. No focal deficits. Strength 5/5, sensation intact.   PSYCH: Cooperative, appropriate. VITAL SIGNS: I have reviewed nursing notes and confirm.  CONSTITUTIONAL: Well-developed; well-nourished; in no acute distress.  SKIN: Skin exam is warm and dry, no acute rash.  HEAD: Normocephalic; atraumatic.  EYES: PERRL, EOM intact; conjunctiva and sclera clear.  ENT: No nasal discharge  NECK: Supple  CARD: S1, S2 normal; no murmurs, gallops, or rubs. Regular rate and rhythm.  RESP: No wheezes, rales or rhonchi. Speaking in full sentences.   ABD: Soft; non-distended; non-tender; No rebound or guarding.   EXT: Normal ROM. No clubbing, cyanosis or edema.  NEURO: Alert, oriented. Grossly unremarkable. No focal deficits. Strength 5/5, sensation intact.  PSYCH: Cooperative, appropriate. VITAL SIGNS: I have reviewed nursing notes and confirm.  CONSTITUTIONAL: Well-developed; well-nourished; in no acute distress.  SKIN: Skin exam is warm and dry, no acute rash.  HEAD: Normocephalic; atraumatic.  EYES: PERRL, EOM intact; conjunctiva and sclera clear.  ENT: No nasal discharge  NECK: Supple  CARD: S1, S2 normal; no murmurs, gallops, or rubs. Regular rate and rhythm.  RESP: No wheezes, rales or rhonchi. Speaking in full sentences.   ABD: Soft; non-distended; non-tender; No rebound or guarding.   EXT: Normal ROM. No clubbing, cyanosis or edema.  NEURO: Mild right flattened nasolabial fold. Alert, oriented Strength 5/5, sensation intact.  PSYCH: Cooperative, appropriate.

## 2024-11-30 NOTE — H&P ADULT - HISTORY OF PRESENT ILLNESS
69 year old male past medical history of Diabetic neuropathy, Diabetes, Hypertension, HLD presenting for headache, generalized weakness and body aches x2 days.  Patient additionally endorsing RUE tingling and weakness last week, and at that time his family states "it looked like he was having a stroke" due to facial asymmetry. denies any fever chills, cough, abdominal pain, nausea, recent falls, chest pain, shortness of breath.    ED vitals: BP: 170/70  HR: 64  RR: 18  Temp:afebrile     Labs: wbc 13.7, glucose 129 , Troponin 34 , RVP -ve      stroke work up:   No acute intracranial pathology. No evidence of midline shift, mass   effect or intracranial hemorrhage.    Mild chronic microvascular type changes.    CTA HEAD:  No evidence of flow-limiting stenosis or occlusion.    2 mm inferolateral directed outpouching along the left aspect of the   basilar artery possibly reflecting aneurysm.    CTA NECK:  Severe atherosclerotic plaque at the origin of the right internal carotid   artery resulting in severe stenosis.    Severe atherosclerotic plaque at the origin of the left vertebral artery  resulting in severe stenosis.    Focal thickening of the wall of the left external carotid artery at its   origin possibly infectious or inflammatory etiology.

## 2024-11-30 NOTE — ED PROVIDER NOTE - ATTENDING APP SHARED VISIT CONTRIBUTION OF CARE
69-year-old male past medical history of hypertension, diabetes with peripheral neuropathy, and dyslipidemia presents for evaluation, states "I feel like I have the flu without the symptoms," has numbness similar to his prior neuropathy but on further questioning had approximately 1 day of right arm heaviness where he was unable to pick it up though could still use his hand, at this time patient also was told by friends at [facility] they thought he was having a stroke because his face was drooping and he was unable to smile however he thought nothing of it, does have mild headache but no fever no cough no runny nose no sore throat no ear pain no nausea no vomiting no diarrhea, on exam vitals appreciated, nontoxic-appearing, obese, head NC/AT, PERRLA, EOMI, has flat right nasolabial fold that was able to smile, neck supple, cor regular lungs clear, abdomen soft nontender, calves nontender, pulses equal, other than flat NLF neurologically intact, lab studies appreciated discussed with neurology, will admit monitored setting for further workup and treatment

## 2024-11-30 NOTE — ED PROVIDER NOTE - CLINICAL SUMMARY MEDICAL DECISION MAKING FREE TEXT BOX
69-year-old male past medical history of hypertension, diabetes with peripheral neuropathy, and dyslipidemia presents for evaluation, states "I feel like I have the flu without the symptoms," has numbness similar to his prior neuropathy but on further questioning had approximately 1 day of right arm heaviness where he was unable to pick it up though could still use his hand, at this time patient also was told by friends at [facility] they thought he was having a stroke because his face was drooping and he was unable to smile however he thought nothing of it, does have mild headache but no fever no cough no runny nose no sore throat no ear pain no nausea no vomiting no diarrhea, on exam vitals appreciated, nontoxic-appearing, obese, head NC/AT, PERRLA, EOMI, has flat right nasolabial fold that was able to smile, neck supple, cor regular lungs clear, abdomen soft nontender, calves nontender, pulses equal, other than flat NLF neurologically intact, lab studies appreciated discussed with neurology, will admit monitored setting for further workup and treatment

## 2024-12-01 LAB
ALBUMIN SERPL ELPH-MCNC: 4.2 G/DL — SIGNIFICANT CHANGE UP (ref 3.5–5.2)
ALP SERPL-CCNC: 38 U/L — SIGNIFICANT CHANGE UP (ref 30–115)
ALT FLD-CCNC: 15 U/L — SIGNIFICANT CHANGE UP (ref 0–41)
ANION GAP SERPL CALC-SCNC: 15 MMOL/L — HIGH (ref 7–14)
AST SERPL-CCNC: 21 U/L — SIGNIFICANT CHANGE UP (ref 0–41)
BILIRUB SERPL-MCNC: 0.5 MG/DL — SIGNIFICANT CHANGE UP (ref 0.2–1.2)
BUN SERPL-MCNC: 17 MG/DL — SIGNIFICANT CHANGE UP (ref 10–20)
CALCIUM SERPL-MCNC: 10 MG/DL — SIGNIFICANT CHANGE UP (ref 8.4–10.5)
CHLORIDE SERPL-SCNC: 100 MMOL/L — SIGNIFICANT CHANGE UP (ref 98–110)
CHOLEST SERPL-MCNC: 228 MG/DL — HIGH
CO2 SERPL-SCNC: 23 MMOL/L — SIGNIFICANT CHANGE UP (ref 17–32)
CREAT SERPL-MCNC: 0.8 MG/DL — SIGNIFICANT CHANGE UP (ref 0.7–1.5)
EGFR: 96 ML/MIN/1.73M2 — SIGNIFICANT CHANGE UP
GLUCOSE BLDC GLUCOMTR-MCNC: 141 MG/DL — HIGH (ref 70–99)
GLUCOSE BLDC GLUCOMTR-MCNC: 143 MG/DL — HIGH (ref 70–99)
GLUCOSE BLDC GLUCOMTR-MCNC: 151 MG/DL — HIGH (ref 70–99)
GLUCOSE BLDC GLUCOMTR-MCNC: 152 MG/DL — HIGH (ref 70–99)
GLUCOSE SERPL-MCNC: 131 MG/DL — HIGH (ref 70–99)
HCT VFR BLD CALC: 41.6 % — LOW (ref 42–52)
HDLC SERPL-MCNC: 38 MG/DL — LOW
HGB BLD-MCNC: 14 G/DL — SIGNIFICANT CHANGE UP (ref 14–18)
LIPID PNL WITH DIRECT LDL SERPL: 130 MG/DL — HIGH
MAGNESIUM SERPL-MCNC: 1.9 MG/DL — SIGNIFICANT CHANGE UP (ref 1.8–2.4)
MCHC RBC-ENTMCNC: 30.1 PG — SIGNIFICANT CHANGE UP (ref 27–31)
MCHC RBC-ENTMCNC: 33.7 G/DL — SIGNIFICANT CHANGE UP (ref 32–37)
MCV RBC AUTO: 89.5 FL — SIGNIFICANT CHANGE UP (ref 80–94)
NON HDL CHOLESTEROL: 190 MG/DL — HIGH
NRBC # BLD: 0 /100 WBCS — SIGNIFICANT CHANGE UP (ref 0–0)
PLATELET # BLD AUTO: 264 K/UL — SIGNIFICANT CHANGE UP (ref 130–400)
PMV BLD: 9.8 FL — SIGNIFICANT CHANGE UP (ref 7.4–10.4)
POTASSIUM SERPL-MCNC: 4 MMOL/L — SIGNIFICANT CHANGE UP (ref 3.5–5)
POTASSIUM SERPL-SCNC: 4 MMOL/L — SIGNIFICANT CHANGE UP (ref 3.5–5)
PROT SERPL-MCNC: 6.7 G/DL — SIGNIFICANT CHANGE UP (ref 6–8)
RBC # BLD: 4.65 M/UL — LOW (ref 4.7–6.1)
RBC # FLD: 12.9 % — SIGNIFICANT CHANGE UP (ref 11.5–14.5)
SODIUM SERPL-SCNC: 138 MMOL/L — SIGNIFICANT CHANGE UP (ref 135–146)
TRIGL SERPL-MCNC: 298 MG/DL — HIGH
WBC # BLD: 8.62 K/UL — SIGNIFICANT CHANGE UP (ref 4.8–10.8)
WBC # FLD AUTO: 8.62 K/UL — SIGNIFICANT CHANGE UP (ref 4.8–10.8)

## 2024-12-01 PROCEDURE — 99223 1ST HOSP IP/OBS HIGH 75: CPT

## 2024-12-01 PROCEDURE — 70450 CT HEAD/BRAIN W/O DYE: CPT | Mod: 26

## 2024-12-01 PROCEDURE — 99232 SBSQ HOSP IP/OBS MODERATE 35: CPT

## 2024-12-01 RX ORDER — ACETAMINOPHEN 500MG 500 MG/1
650 TABLET, COATED ORAL EVERY 6 HOURS
Refills: 0 | Status: DISCONTINUED | OUTPATIENT
Start: 2024-12-01 | End: 2024-12-03

## 2024-12-01 RX ORDER — CHLORHEXIDINE GLUCONATE 1.2 MG/ML
1 RINSE ORAL
Refills: 0 | Status: DISCONTINUED | OUTPATIENT
Start: 2024-12-01 | End: 2024-12-03

## 2024-12-01 RX ORDER — CLOPIDOGREL 75 MG/1
75 TABLET, FILM COATED ORAL DAILY
Refills: 0 | Status: DISCONTINUED | OUTPATIENT
Start: 2024-12-01 | End: 2024-12-03

## 2024-12-01 RX ADMIN — ACETAMINOPHEN 500MG 650 MILLIGRAM(S): 500 TABLET, COATED ORAL at 11:11

## 2024-12-01 RX ADMIN — Medication 30 MILLIGRAM(S): at 00:07

## 2024-12-01 RX ADMIN — ACETAMINOPHEN 500MG 650 MILLIGRAM(S): 500 TABLET, COATED ORAL at 20:02

## 2024-12-01 RX ADMIN — CLOPIDOGREL 75 MILLIGRAM(S): 75 TABLET, FILM COATED ORAL at 17:25

## 2024-12-01 RX ADMIN — Medication 5 MILLIGRAM(S): at 11:11

## 2024-12-01 RX ADMIN — Medication 80 MILLIGRAM(S): at 22:57

## 2024-12-01 RX ADMIN — TAMSULOSIN HYDROCHLORIDE 0.4 MILLIGRAM(S): 0.4 CAPSULE ORAL at 22:57

## 2024-12-01 RX ADMIN — Medication 2: at 11:40

## 2024-12-01 RX ADMIN — Medication 145 MILLIGRAM(S): at 11:40

## 2024-12-01 RX ADMIN — Medication 81 MILLIGRAM(S): at 11:11

## 2024-12-01 RX ADMIN — AMLODIPINE BESYLATE 10 MILLIGRAM(S): 10 TABLET ORAL at 06:05

## 2024-12-01 RX ADMIN — Medication 2: at 07:41

## 2024-12-01 RX ADMIN — CHLORHEXIDINE GLUCONATE 1 APPLICATION(S): 1.2 RINSE ORAL at 06:05

## 2024-12-01 RX ADMIN — ACETAMINOPHEN 500MG 650 MILLIGRAM(S): 500 TABLET, COATED ORAL at 20:45

## 2024-12-01 RX ADMIN — ENOXAPARIN SODIUM 40 MILLIGRAM(S): 30 INJECTION SUBCUTANEOUS at 06:05

## 2024-12-01 RX ADMIN — ACETAMINOPHEN 500MG 650 MILLIGRAM(S): 500 TABLET, COATED ORAL at 11:37

## 2024-12-01 NOTE — PHYSICAL THERAPY INITIAL EVALUATION ADULT - IMPAIRMENTS FOUND, PT EVAL
aerobic capacity/endurance/anthropometric characteristics/ergonomics and body mechanics/gait, locomotion, and balance/gross motor/joint integrity and mobility/muscle strength/poor safety awareness/posture

## 2024-12-01 NOTE — PHYSICAL THERAPY INITIAL EVALUATION ADULT - TRANSFER SAFETY CONCERNS NOTED: SIT/STAND, REHAB EVAL
decreased balance during turns/decreased safety awareness/decreased proprioception/decreased sequencing ability/decreased step length
no

## 2024-12-01 NOTE — CONSULT NOTE ADULT - SUBJECTIVE AND OBJECTIVE BOX
Neurology Consult    Patient is a 69y old  Male who presents with a chief complaint of TIA (01 Dec 2024 10:24)    HPI:  70 yo RHM w/ h/o Diabetic neuropathy, Diabetes, Hypertension, HLD p/w acute R facial droop and RUE weakness starting Thanksgiving noted by family 4d prior persistent with some improvement in RUE strength also noted to have some sensory abnormalities in the R distal hand.  Denies any problems in the past.  Had some constitutional symptoms during the last few days feeling "off" but denies any fever chills, cough, abdominal pain, nausea, recent falls, chest pain, shortness of breath.  No vertigo, diplopia, visual obscuration or clumsiness.  No problems on the left side.  Is otherwise in his usual state of health.      ED vitals: BP: 170/70  HR: 64  RR: 18  Temp:afebrile     Labs: wbc 13.7, glucose 129 , Troponin 34 , RVP -ve      stroke work up:   No acute intracranial pathology. No evidence of midline shift, mass   effect or intracranial hemorrhage.    Mild chronic microvascular type changes.    PAST MEDICAL & SURGICAL HISTORY:  HTN (hypertension)  HLD (hyperlipidemia)  DM (diabetes mellitus)    History of cholecystectomy    FAMILY HISTORY:    Social History: (-) x 2; occasional cigar, no cigarettes    Allergies    No Known Allergies    Intolerances    MEDICATIONS  (STANDING):  amLODIPine   Tablet 10 milliGRAM(s) Oral daily  aspirin  chewable 81 milliGRAM(s) Oral daily  atorvastatin 80 milliGRAM(s) Oral at bedtime  chlorhexidine 2% Cloths 1 Application(s) Topical <User Schedule>  dextrose 5%. 1000 milliLiter(s) (50 mL/Hr) IV Continuous <Continuous>  dextrose 5%. 1000 milliLiter(s) (100 mL/Hr) IV Continuous <Continuous>  dextrose 50% Injectable 25 Gram(s) IV Push once  dextrose 50% Injectable 12.5 Gram(s) IV Push once  dextrose 50% Injectable 25 Gram(s) IV Push once  enoxaparin Injectable 40 milliGRAM(s) SubCutaneous every 24 hours  fenofibrate Tablet 145 milliGRAM(s) Oral daily  finasteride 5 milliGRAM(s) Oral daily  glucagon  Injectable 1 milliGRAM(s) IntraMuscular once  influenza  Vaccine (HIGH DOSE) 0.5 milliLiter(s) IntraMuscular once  insulin lispro (ADMELOG) corrective regimen sliding scale   SubCutaneous three times a day before meals  insulin lispro (ADMELOG) corrective regimen sliding scale   SubCutaneous at bedtime  tamsulosin 0.4 milliGRAM(s) Oral at bedtime    MEDICATIONS  (PRN):  acetaminophen     Tablet .. 650 milliGRAM(s) Oral every 6 hours PRN Mild Pain (1 - 3)  dextrose Oral Gel 15 Gram(s) Oral once PRN Blood Glucose LESS THAN 70 milliGRAM(s)/deciliter  NIFEdipine XL 30 milliGRAM(s) Oral daily PRN BP >160/90  senna 2 Tablet(s) Oral at bedtime PRN Constipation    Review of systems:    Constitutional: as per HPI  Eyes: No eye pain or discharge  ENMT:  No difficulty hearing; No sinus or throat pain  Neck: No pain or stiffness  Respiratory: No cough, wheezing, chills or hemoptysis  Cardiovascular: No chest pain, palpitations, shortness of breath, dyspnea on exertion  Gastrointestinal: No abdominal pain, nausea, vomiting or hematemesis; No diarrhea or constipation.   Genitourinary: No dysuria, frequency, hematuria or incontinence  Neurological: As per HPI  Skin: No rashes or lesions   Endocrine: No heat or cold intolerance; No hair loss  Musculoskeletal: No joint pain or swelling  Psychiatric: No depression, anxiety, mood swings  Heme/Lymph: No easy bruising or bleeding gums    Vital Signs Last 24 Hrs  T(C): 36 (01 Dec 2024 07:11), Max: 36.7 (30 Nov 2024 13:20)  T(F): 96.8 (01 Dec 2024 07:11), Max: 98 (30 Nov 2024 13:20)  HR: 72 (01 Dec 2024 12:00) (56 - 73)  BP: 120/59 (01 Dec 2024 12:00) (120/59 - 198/93)  BP(mean): 85 (01 Dec 2024 12:00) (85 - 108)  RR: 17 (01 Dec 2024 07:28) (15 - 24)  SpO2: 96% (01 Dec 2024 12:00) (94% - 99%)    Parameters below as of 01 Dec 2024 08:19  Patient On (Oxygen Delivery Method): room air    Examination:  General:  Appearance is consistent with chronologic age.  No abnormal facies.  Gross skin survey within normal limits.    Cognitive/Language:  The patient is oriented to person, place, time and date.  Recent and remote memory intact.  Fund of knowledge is intact and normal.  Language with normal repetition, comprehension and naming.  Nondysarthric.    Eyes: intact VA, VFF.  EOMI w/o nystagmus, skew or reported double vision.  PERRL.  No ptosis/weakness of eyelid closure.  R palpebral fissure > L - chronic since childhood  Face:  Facial sensation normal V1 - 3, no facial asymmetry.    Ears/Nose/Throat:  Hearing grossly intact b/l.  Palate elevates midline.  Tongue and uvula midline.   Motor examination:   Normal tone, bulk and range of motion.  No tenderness, twitching, tremors or involuntary movements.  Formal Muscle Strength Testing: (MRC grade R/L) 5/5 LUE; 5/5 b/l LE.  No observable drift.  Subtle RUE weakness 5-  Reflexes:   2+ b/l pectoralis, biceps, triceps, brachioradialis, patella and Achilles.  Plantar response downgoing b/l.  Jaw jerk, Katherin, clonus absent.  Sensory examination:   Intact to light touch and pinprick, pain, temperature and proprioception and vibration in all extremities.  Cerebellum:   FTN/HKS intact with normal ALEXSANDRA in all limbs.  No dysmetria or dysdiadokinesia.  Gait narrow based and normal.    NIHSS 1 (R NLF)    Labs:   CBC Full  -  ( 01 Dec 2024 06:15 )  WBC Count : 8.62 K/uL  RBC Count : 4.65 M/uL  Hemoglobin : 14.0 g/dL  Hematocrit : 41.6 %  Platelet Count - Automated : 264 K/uL  Mean Cell Volume : 89.5 fL  Mean Cell Hemoglobin : 30.1 pg  Mean Cell Hemoglobin Concentration : 33.7 g/dL    12-01    138  |  100  |  17  ----------------------------<  131[H]  4.0   |  23  |  0.8    Ca    10.0      01 Dec 2024 06:15  Mg     1.9     12-01    TPro  6.7  /  Alb  4.2  /  TBili  0.5  /  DBili  x   /  AST  21  /  ALT  15  /  AlkPhos  38  12-01    LIVER FUNCTIONS - ( 01 Dec 2024 06:15 )  Alb: 4.2 g/dL / Pro: 6.7 g/dL / ALK PHOS: 38 U/L / ALT: 15 U/L / AST: 21 U/L / GGT: x           PT/INR - ( 30 Nov 2024 15:35 )   PT: 11.50 sec;   INR: 0.98 ratio         PTT - ( 30 Nov 2024 15:35 )  PTT:31.6 sec  Urinalysis Basic - ( 01 Dec 2024 06:15 )    Neuroimaging:  NCHCT: CT Head No Cont:   ACC: [id number] EXAM:  CT BRAIN   ORDERED BY: Katherin Fernandez     PROCEDURE DATE:  11/30/2024      INTERPRETATION:  CLINICAL INDICATION: Weakness.    Technique: CT of the head was performed without contrast.    Multiple contiguous axial images were acquired from the skullbase to the   vertex without the administration of intravenous contrast.  Coronal and   sagittal reformations were made.    COMPARISON:  prior head CT dated a 12/20/2021.    FINDINGS:    The ventricles and sulci are unremarkable in appearance.    There is patchy periventricular as well as subcortical white matter white   matter hypodensities. There is no intraparenchymal hematoma, mass effect   or midline shift. No abnormal extra-axial fluid collections are present.    The calvarium is intact. The visualized intraorbital compartments,   paranasal sinuses and mastoid complexes appear free of acute disease.   There has been right cataract surgery.    IMPRESSION:  No acute intracranial pathology. No evidence of midline shift, mass   effect or intracranial hemorrhage.    Mild chronic microvascular type changes.    --- End of Report ---    MARIAMA TRIPLETT MD; Attending Radiologist  This document has been electronically signed. Nov 30 2024  4:34PM (11-30-24 @ 16:32)  12-01-24 @ 12:31    < from: CT Angio Neck w/ IV Cont (11.30.24 @ 16:32) >  IMPRESSION:    CT PERFUSION:  Scattered regions of hypoperfusion totaling 23 mL in nonanatomic   distribution. No evidence of core infarct.    CTA HEAD:  No evidence of flow-limiting stenosis or occlusion.    2 mm inferolateral directed vascular outpouching along the left aspect of   the proximal basilar artery possibly reflecting aneurysm.    CTA NECK:  High-grade/critical stenosis of the proximal right internal carotid   artery due to calcified and noncalcified atherosclerotic plaque.    Severe stenosis at the origin of the left vertebral artery due to   calcification.    --- End of Report ---      SHANNAN BLANCAS MD; Resident Radiologist  This document has been electronically signed.  MARIAMA TRIPLETT MD; Attending Radiologist  This document has been electronically signed. Nov 30 2024  8:02PM    < end of copied text >

## 2024-12-01 NOTE — CONSULT NOTE ADULT - ASSESSMENT
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  Suspect lacunar infarct not seen on HCT but clinically suspicious.  Unclear contribution from R ICAS or L vertebral stenosis.  Pt severely claustrophobic and refusing MRI even with IV sedation at this time.      Recommendations:  - continue ASA 81 QD and start Plavix 75 QD  - Atorvastatin 80 mg QD  - continue telemetry and Q4 neurochecks for now  - maintain  - 160  - f/u Echo results  - Neuroendovascular consult for severe R ICAS and L vert stenosis  - repeat non-contrast HCT  - PT/OT eval and treat

## 2024-12-01 NOTE — PHYSICAL THERAPY INITIAL EVALUATION ADULT - PERTINENT HX OF CURRENT PROBLEM, REHAB EVAL
69 year old male past medical history of Diabetic neuropathy, Diabetes, Hypertension, HLD presenting for headache, generalized weakness and body aches x2 days.  Patient additionally endorsing RUE tingling and weakness last week, and at that time his family states "it looked like he was having a stroke" due to facial asymmetry. denies any fever chills, cough, abdominal pain, nausea, recent falls, chest pain, shortness of breath.

## 2024-12-01 NOTE — PHYSICAL THERAPY INITIAL EVALUATION ADULT - ADDITIONAL COMMENTS
Pt states PTA he used a SPC in L hand to ambulate, independent with all ADLs.  Pt lives in  with 8 steps to enter, handrail on the L.  Pt states he has about 20 steps in the home to get to his bedroom, handrail on the L.

## 2024-12-01 NOTE — PHYSICAL THERAPY INITIAL EVALUATION ADULT - GENERAL OBSERVATIONS, REHAB EVAL
09:50-10:16, chart thoroughly reviewed, ok to be seen for PT as per ARNULFO Alfaro, pt in agreement with PT.  Pt received in bed in semi-fowlers, +BP cuff, +pulseox, +tele, +heplock, and left in chair, all lines in tact, in NAD, ARNULFO Alfaro notified.

## 2024-12-01 NOTE — PROGRESS NOTE ADULT - ASSESSMENT
69 year old male past medical history of Diabetic neuropathy, Diabetes, Hypertension, HLD presenting for headache, generalized weakness and body aches x2 days.     R sided weakness /  R ICA stenosis     - pt reports he will not tolerate MRI 69 year old male past medical history of Diabetic neuropathy, Diabetes, Hypertension, HLD presenting for headache, generalized weakness and body aches x2 days.     R sided weakness /  R ICA stenosis     - aspirin, Lipitor   - pt reports he will not tolerate MRI   - vascular consult for ICA stenosis   - check repeat CT brain today   - neuro consult   - TTE   - tele

## 2024-12-01 NOTE — PHYSICAL THERAPY INITIAL EVALUATION ADULT - CRITERIA FOR SKILLED THERAPEUTIC INTERVENTIONS
functional limitations in following categories/rehab potential/therapy frequency/predicted duration of therapy intervention/anticipated equipment needs at discharge

## 2024-12-02 ENCOUNTER — RESULT REVIEW (OUTPATIENT)
Age: 69
End: 2024-12-02

## 2024-12-02 LAB
ALBUMIN SERPL ELPH-MCNC: 4.1 G/DL — SIGNIFICANT CHANGE UP (ref 3.5–5.2)
ALP SERPL-CCNC: 37 U/L — SIGNIFICANT CHANGE UP (ref 30–115)
ALT FLD-CCNC: 14 U/L — SIGNIFICANT CHANGE UP (ref 0–41)
ANION GAP SERPL CALC-SCNC: 15 MMOL/L — HIGH (ref 7–14)
AST SERPL-CCNC: 18 U/L — SIGNIFICANT CHANGE UP (ref 0–41)
BASOPHILS # BLD AUTO: 0.04 K/UL — SIGNIFICANT CHANGE UP (ref 0–0.2)
BASOPHILS NFR BLD AUTO: 0.5 % — SIGNIFICANT CHANGE UP (ref 0–1)
BILIRUB SERPL-MCNC: 0.5 MG/DL — SIGNIFICANT CHANGE UP (ref 0.2–1.2)
BUN SERPL-MCNC: 18 MG/DL — SIGNIFICANT CHANGE UP (ref 10–20)
CALCIUM SERPL-MCNC: 9.5 MG/DL — SIGNIFICANT CHANGE UP (ref 8.4–10.5)
CHLORIDE SERPL-SCNC: 101 MMOL/L — SIGNIFICANT CHANGE UP (ref 98–110)
CO2 SERPL-SCNC: 23 MMOL/L — SIGNIFICANT CHANGE UP (ref 17–32)
CREAT SERPL-MCNC: 0.8 MG/DL — SIGNIFICANT CHANGE UP (ref 0.7–1.5)
EGFR: 96 ML/MIN/1.73M2 — SIGNIFICANT CHANGE UP
EOSINOPHIL # BLD AUTO: 0.15 K/UL — SIGNIFICANT CHANGE UP (ref 0–0.7)
EOSINOPHIL NFR BLD AUTO: 2.1 % — SIGNIFICANT CHANGE UP (ref 0–8)
GLUCOSE BLDC GLUCOMTR-MCNC: 159 MG/DL — HIGH (ref 70–99)
GLUCOSE BLDC GLUCOMTR-MCNC: 165 MG/DL — HIGH (ref 70–99)
GLUCOSE BLDC GLUCOMTR-MCNC: 169 MG/DL — HIGH (ref 70–99)
GLUCOSE BLDC GLUCOMTR-MCNC: 197 MG/DL — HIGH (ref 70–99)
GLUCOSE SERPL-MCNC: 156 MG/DL — HIGH (ref 70–99)
HCT VFR BLD CALC: 40.4 % — LOW (ref 42–52)
HGB BLD-MCNC: 13.8 G/DL — LOW (ref 14–18)
IMM GRANULOCYTES NFR BLD AUTO: 1 % — HIGH (ref 0.1–0.3)
LYMPHOCYTES # BLD AUTO: 1.21 K/UL — SIGNIFICANT CHANGE UP (ref 1.2–3.4)
LYMPHOCYTES # BLD AUTO: 16.6 % — LOW (ref 20.5–51.1)
MAGNESIUM SERPL-MCNC: 1.8 MG/DL — SIGNIFICANT CHANGE UP (ref 1.8–2.4)
MCHC RBC-ENTMCNC: 30.6 PG — SIGNIFICANT CHANGE UP (ref 27–31)
MCHC RBC-ENTMCNC: 34.2 G/DL — SIGNIFICANT CHANGE UP (ref 32–37)
MCV RBC AUTO: 89.6 FL — SIGNIFICANT CHANGE UP (ref 80–94)
MONOCYTES # BLD AUTO: 0.73 K/UL — HIGH (ref 0.1–0.6)
MONOCYTES NFR BLD AUTO: 10 % — HIGH (ref 1.7–9.3)
NEUTROPHILS # BLD AUTO: 5.08 K/UL — SIGNIFICANT CHANGE UP (ref 1.4–6.5)
NEUTROPHILS NFR BLD AUTO: 69.8 % — SIGNIFICANT CHANGE UP (ref 42.2–75.2)
NRBC # BLD: 0 /100 WBCS — SIGNIFICANT CHANGE UP (ref 0–0)
PLATELET # BLD AUTO: 256 K/UL — SIGNIFICANT CHANGE UP (ref 130–400)
PMV BLD: 9.8 FL — SIGNIFICANT CHANGE UP (ref 7.4–10.4)
POTASSIUM SERPL-MCNC: 3.6 MMOL/L — SIGNIFICANT CHANGE UP (ref 3.5–5)
POTASSIUM SERPL-SCNC: 3.6 MMOL/L — SIGNIFICANT CHANGE UP (ref 3.5–5)
PROT SERPL-MCNC: 6.3 G/DL — SIGNIFICANT CHANGE UP (ref 6–8)
RBC # BLD: 4.51 M/UL — LOW (ref 4.7–6.1)
RBC # FLD: 12.6 % — SIGNIFICANT CHANGE UP (ref 11.5–14.5)
SODIUM SERPL-SCNC: 139 MMOL/L — SIGNIFICANT CHANGE UP (ref 135–146)
TSH SERPL-MCNC: 2.86 UIU/ML — SIGNIFICANT CHANGE UP (ref 0.27–4.2)
VIT B12 SERPL-MCNC: 522 PG/ML — SIGNIFICANT CHANGE UP (ref 232–1245)
WBC # BLD: 7.28 K/UL — SIGNIFICANT CHANGE UP (ref 4.8–10.8)
WBC # FLD AUTO: 7.28 K/UL — SIGNIFICANT CHANGE UP (ref 4.8–10.8)

## 2024-12-02 PROCEDURE — 99232 SBSQ HOSP IP/OBS MODERATE 35: CPT

## 2024-12-02 PROCEDURE — 93306 TTE W/DOPPLER COMPLETE: CPT | Mod: 26

## 2024-12-02 RX ORDER — BISMUTH SUBSALICYLATE 262MG/15ML
30 SUSPENSION, ORAL (FINAL DOSE FORM) ORAL ONCE
Refills: 0 | Status: COMPLETED | OUTPATIENT
Start: 2024-12-02 | End: 2024-12-02

## 2024-12-02 RX ADMIN — CLOPIDOGREL 75 MILLIGRAM(S): 75 TABLET, FILM COATED ORAL at 11:05

## 2024-12-02 RX ADMIN — AMLODIPINE BESYLATE 10 MILLIGRAM(S): 10 TABLET ORAL at 05:36

## 2024-12-02 RX ADMIN — Medication 81 MILLIGRAM(S): at 11:05

## 2024-12-02 RX ADMIN — Medication 145 MILLIGRAM(S): at 11:06

## 2024-12-02 RX ADMIN — ACETAMINOPHEN 500MG 650 MILLIGRAM(S): 500 TABLET, COATED ORAL at 20:00

## 2024-12-02 RX ADMIN — Medication 2: at 08:01

## 2024-12-02 RX ADMIN — Medication 30 MILLILITER(S): at 10:41

## 2024-12-02 RX ADMIN — ENOXAPARIN SODIUM 40 MILLIGRAM(S): 30 INJECTION SUBCUTANEOUS at 05:37

## 2024-12-02 RX ADMIN — Medication 80 MILLIGRAM(S): at 21:34

## 2024-12-02 RX ADMIN — Medication 2: at 16:21

## 2024-12-02 RX ADMIN — Medication 30 MILLILITER(S): at 21:34

## 2024-12-02 RX ADMIN — Medication 2: at 11:50

## 2024-12-02 RX ADMIN — TAMSULOSIN HYDROCHLORIDE 0.4 MILLIGRAM(S): 0.4 CAPSULE ORAL at 21:34

## 2024-12-02 RX ADMIN — Medication 5 MILLIGRAM(S): at 11:06

## 2024-12-02 RX ADMIN — ACETAMINOPHEN 500MG 650 MILLIGRAM(S): 500 TABLET, COATED ORAL at 19:19

## 2024-12-02 RX ADMIN — CHLORHEXIDINE GLUCONATE 1 APPLICATION(S): 1.2 RINSE ORAL at 05:42

## 2024-12-02 NOTE — PROGRESS NOTE ADULT - NS ATTEND AMEND GEN_ALL_CORE FT
Patient seen and examined and agree with above except as noted.  Patients history, notes, labs, imaging, vitals and meds reviewed personally.    Plan as above  See chart note placed later

## 2024-12-02 NOTE — DIETITIAN INITIAL EVALUATION ADULT - PERTINENT LABORATORY DATA
12-02    139  |  101  |  18  ----------------------------<  156[H]  3.6   |  23  |  0.8    Ca    9.5      02 Dec 2024 06:04  Mg     1.8     12-02    TPro  6.3  /  Alb  4.1  /  TBili  0.5  /  DBili  x   /  AST  18  /  ALT  14  /  AlkPhos  37  12-02  POCT Blood Glucose.: 169 mg/dL (12-02-24 @ 16:06)                          13.8   7.28  )-----------( 256      ( 02 Dec 2024 06:04 )             40.4

## 2024-12-02 NOTE — DIETITIAN INITIAL EVALUATION ADULT - ORAL INTAKE PTA/DIET HISTORY
as per pt consumed a diabetic diet PTA with good po intake, pt has been on Ozempic no significant weight loss. NKFA or intolerances        presently on a CHO consistent diet tolerating well consumes %

## 2024-12-02 NOTE — PROGRESS NOTE ADULT - SUBJECTIVE AND OBJECTIVE BOX
Patient seen and evaluated this am, reports R arm weakness is improved       T(F): 96.8 (12-01-24 @ 07:11), Max: 98 (11-30-24 @ 13:20)  HR: 73 (12-01-24 @ 08:19)  BP: 133/62 (12-01-24 @ 08:19)  RR: 17 (12-01-24 @ 07:28)  SpO2: 96% (12-01-24 @ 08:19) (94% - 99%)    PHYSICAL EXAM:  GENERAL: NAD  HEAD:  Atraumatic, Normocephalic  EYES: EOMI, PERRLA, conjunctiva and sclera clear  NERVOUS SYSTEM:  Alert & Oriented X3, no focal deficits   CHEST/LUNG: Clear to percussion bilaterally; No rales, rhonchi, wheezing, or rubs  HEART: Regular rate and rhythm; No murmurs, rubs, or gallops  ABDOMEN: Soft, Nontender, Nondistended; Bowel sounds present  EXTREMITIES:  2+ Peripheral Pulses, No clubbing, cyanosis, or edema    LABS  12-01    138  |  100  |  17  ----------------------------<  131[H]  4.0   |  23  |  0.8    Ca    10.0      01 Dec 2024 06:15  Mg     1.9     12-01    TPro  6.7  /  Alb  4.2  /  TBili  0.5  /  DBili  x   /  AST  21  /  ALT  15  /  AlkPhos  38  12-01                          14.0   8.62  )-----------( 264      ( 01 Dec 2024 06:15 )             41.6     PT/INR - ( 30 Nov 2024 15:35 )   PT: 11.50 sec;   INR: 0.98 ratio         PTT - ( 30 Nov 2024 15:35 )  PTT:31.6 sec    < from: 12 Lead ECG (11.30.24 @ 16:05) >  Diagnosis Line Sinus bradycardiawith frequent Premature ventricular complexes    < end of copied text >      RADIOLOGY  < from: CT Perfusion w/ Maps w/ IV Cont (11.30.24 @ 16:32) >  IMPRESSION:    CT PERFUSION:  Scattered regions of hypoperfusion totaling 23 mL in nonanatomic   distribution. No evidence of core infarct.    CTA HEAD:  No evidence of flow-limiting stenosis or occlusion.    2 mm inferolateral directed vascular outpouching along the left aspect of   the proximal basilar artery possibly reflecting aneurysm.    CTA NECK:  High-grade/critical stenosis of the proximal right internal carotid   artery due to calcified and noncalcified atherosclerotic plaque.    Severe stenosis at the origin of the left vertebral artery due to   calcification.    < end of copied text >    MEDICATIONS  (STANDING):  amLODIPine   Tablet 10 milliGRAM(s) Oral daily  aspirin  chewable 81 milliGRAM(s) Oral daily  atorvastatin 80 milliGRAM(s) Oral at bedtime  chlorhexidine 2% Cloths 1 Application(s) Topical <User Schedule  enoxaparin Injectable 40 milliGRAM(s) SubCutaneous every 24 hours  fenofibrate Tablet 145 milliGRAM(s) Oral daily  finasteride 5 milliGRAM(s) Oral daily  insulin lispro (ADMELOG) corrective regimen sliding scale   SubCutaneous three times a day before meals  insulin lispro (ADMELOG) corrective regimen sliding scale   SubCutaneous at bedtime  tamsulosin 0.4 milliGRAM(s) Oral at bedtime    MEDICATIONS  (PRN):  dextrose Oral Gel 15 Gram(s) Oral once PRN Blood Glucose LESS THAN 70 milliGRAM(s)/deciliter  NIFEdipine XL 30 milliGRAM(s) Oral daily PRN BP >160/90  senna 2 Tablet(s) Oral at bedtime PRN Constipation    
 Patient seen and evaluated this am, R arm is feeling improved      T(F): 95.5 (12-02-24 @ 07:01), Max: 98.8 (12-01-24 @ 23:24)  HR: 79 (12-02-24 @ 08:00)  BP: 160/74 (12-02-24 @ 08:00)  RR: 18 (12-02-24 @ 07:01)  SpO2: 99% (12-02-24 @ 08:00) (95% - 99%)    PHYSICAL EXAM:  GENERAL: NAD  HEAD:  Atraumatic, Normocephalic  EYES: EOMI, PERRLA, conjunctiva and sclera clear  NERVOUS SYSTEM:  Alert & Oriented X3, no focal deficits   CHEST/LUNG: Clear to percussion bilaterally; No rales, rhonchi, wheezing, or rubs  HEART: Regular rate and rhythm; No murmurs, rubs, or gallops  ABDOMEN: Soft, Nontender, Nondistended; Bowel sounds present  EXTREMITIES:  2+ Peripheral Pulses, No clubbing, cyanosis, or edema    LABS  12-02    139  |  101  |  18  ----------------------------<  156[H]  3.6   |  23  |  0.8    Ca    9.5      02 Dec 2024 06:04  Mg     1.8     12-02    TPro  6.3  /  Alb  4.1  /  TBili  0.5  /  DBili  x   /  AST  18  /  ALT  14  /  AlkPhos  37  12-02                          13.8   7.28  )-----------( 256      ( 02 Dec 2024 06:04 )             40.4     PT/INR - ( 30 Nov 2024 15:35 )   PT: 11.50 sec;   INR: 0.98 ratio         PTT - ( 30 Nov 2024 15:35 )  PTT:31.6 sec    < from: TTE Echo Complete w/ Contrast w/ Doppler (12.02.24 @ 09:02) >  Summary:   1. Normal global left ventricular systolic function.   2. LV Ejection Fraction by Gallegos's Method with a biplane EF of 71 %.   3. Mild concentric left ventricular hypertrophy.   4. Normal left atrial size.   5. Normal right atrial size.   6. There is mild aortic root calcification.   7. Moderate aortic valve stenosis.   8. Mild to moderate mitral annular calcification.   9. Moderate thickening and calcification of the posterior mitral valve   leaflet.  10. Trace mitral valve regurgitation.  11. Adequate TR velocity was not obtained to accurately assess RVSP.  12. There is no evidence of pericardial effusion.      < end of copied text >    < from: 12 Lead ECG (11.30.24 @ 16:05) >  Diagnosis Line Sinus bradycardiawith frequent Premature ventricular complexes    < end of copied text >    RADIOLOGY  < from: CT Head No Cont (12.01.24 @ 17:22) >  IMPRESSION:    No acute intracranial pathology.    Stable mild chronic microvascular ischemic changes.    < end of copied text >  < from: CT Perfusion w/ Maps w/ IV Cont (11.30.24 @ 16:32) >  IMPRESSION:    CT PERFUSION:  Scattered regions of hypoperfusion totaling 23 mL in nonanatomic   distribution. No evidence of core infarct.    CTA HEAD:  No evidence of flow-limiting stenosis or occlusion.    2 mm inferolateral directed vascular outpouching along the left aspect of   the proximal basilar artery possibly reflecting aneurysm.    CTA NECK:  High-grade/critical stenosis of the proximal right internal carotid   artery due to calcified and noncalcified atherosclerotic plaque.    Severe stenosis at the origin of the left vertebral artery due to   calcification.      < end of copied text >    MEDICATIONS  (STANDING):  amLODIPine   Tablet 10 milliGRAM(s) Oral daily  aspirin  chewable 81 milliGRAM(s) Oral daily  atorvastatin 80 milliGRAM(s) Oral at bedtime  chlorhexidine 2% Cloths 1 Application(s) Topical <User Schedule>  clopidogrel Tablet 75 milliGRAM(s) Oral daily  enoxaparin Injectable 40 milliGRAM(s) SubCutaneous every 24 hours  fenofibrate Tablet 145 milliGRAM(s) Oral daily  finasteride 5 milliGRAM(s) Oral daily  insulin lispro (ADMELOG) corrective regimen sliding scale   SubCutaneous three times a day before meals  insulin lispro (ADMELOG) corrective regimen sliding scale   SubCutaneous at bedtime  tamsulosin 0.4 milliGRAM(s) Oral at bedtime    MEDICATIONS  (PRN):  acetaminophen     Tablet .. 650 milliGRAM(s) Oral every 6 hours PRN Mild Pain (1 - 3)  dextrose Oral Gel 15 Gram(s) Oral once PRN Blood Glucose LESS THAN 70 milliGRAM(s)/deciliter  NIFEdipine XL 30 milliGRAM(s) Oral daily PRN BP >160/90  senna 2 Tablet(s) Oral at bedtime PRN Constipation    
NEUROLOGY PROGRESS NOTE     Interval HPI: Patient seen and examined, sitting in chair next to his bed. He reports resolution of paresthesias, still with some mild weakness. He is eager to go home. He is unsure if he has always had a right facial droop, but looking at a picture on his phone from 3 years ago, did not appear evident then.     HPI: 69 year old male past medical history of Diabetic neuropathy, Diabetes, Hypertension, HLD presenting for headache, generalized weakness and body aches x2 days.  Patient additionally endorsing RUE tingling and weakness last week, and at that time his family states "it looked like he was having a stroke" due to facial asymmetry. denies any fever chills, cough, abdominal pain, nausea, recent falls, chest pain, shortness of breath.    Baseline mRS= 0    ED vitals: BP: 170/70  HR: 64  RR: 18  Temp:afebrile     Labs: wbc 13.7, glucose 129 , Troponin 34 , RVP -ve      stroke work up:   No acute intracranial pathology. No evidence of midline shift, mass   effect or intracranial hemorrhage.    Mild chronic microvascular type changes.    CTA HEAD:  No evidence of flow-limiting stenosis or occlusion.    2 mm inferolateral directed outpouching along the left aspect of the   basilar artery possibly reflecting aneurysm.    CTA NECK:  Severe atherosclerotic plaque at the origin of the right internal carotid   artery resulting in severe stenosis.    Severe atherosclerotic plaque at the origin of the left vertebral artery  resulting in severe stenosis.    Focal thickening of the wall of the left external carotid artery at its   origin possibly infectious or inflammatory etiology.       (30 Nov 2024 19:41)     MEDICATIONS  Home Medications:  fenofibrate 160 mg oral tablet: 1 tab(s) orally once a day (30 Nov 2024 19:32)  finasteride 5 mg oral tablet: 1 tab(s) orally once a day (30 Nov 2024 19:32)  glimepiride 4 mg oral tablet: 1 tab(s) orally once a day (30 Nov 2024 19:32)  Norvasc 10 mg oral tablet: 1 tab(s) orally once a day (30 Nov 2024 19:32)  Ozempic 2 mg/1.5 mL (0.25 mg or 0.5 mg dose) subcutaneous solution: 0.5 milligram(s) subcutaneously once a week (30 Nov 2024 19:32)  pravastatin 20 mg oral tablet: 1 tab(s) orally once a day (at bedtime) (30 Nov 2024 19:32)  tamsulosin 0.4 mg oral capsule: 1 cap(s) orally once a day (30 Nov 2024 19:32)    MEDICATIONS  (STANDING):  amLODIPine   Tablet 10 milliGRAM(s) Oral daily  aspirin  chewable 81 milliGRAM(s) Oral daily  atorvastatin 80 milliGRAM(s) Oral at bedtime  chlorhexidine 2% Cloths 1 Application(s) Topical <User Schedule>  clopidogrel Tablet 75 milliGRAM(s) Oral daily  dextrose 5%. 1000 milliLiter(s) (50 mL/Hr) IV Continuous <Continuous>  dextrose 5%. 1000 milliLiter(s) (100 mL/Hr) IV Continuous <Continuous>  dextrose 50% Injectable 25 Gram(s) IV Push once  dextrose 50% Injectable 12.5 Gram(s) IV Push once  dextrose 50% Injectable 25 Gram(s) IV Push once  enoxaparin Injectable 40 milliGRAM(s) SubCutaneous every 24 hours  fenofibrate Tablet 145 milliGRAM(s) Oral daily  finasteride 5 milliGRAM(s) Oral daily  glucagon  Injectable 1 milliGRAM(s) IntraMuscular once  influenza  Vaccine (HIGH DOSE) 0.5 milliLiter(s) IntraMuscular once  insulin lispro (ADMELOG) corrective regimen sliding scale   SubCutaneous three times a day before meals  insulin lispro (ADMELOG) corrective regimen sliding scale   SubCutaneous at bedtime  tamsulosin 0.4 milliGRAM(s) Oral at bedtime    MEDICATIONS  (PRN):  acetaminophen     Tablet .. 650 milliGRAM(s) Oral every 6 hours PRN Mild Pain (1 - 3)  dextrose Oral Gel 15 Gram(s) Oral once PRN Blood Glucose LESS THAN 70 milliGRAM(s)/deciliter  NIFEdipine XL 30 milliGRAM(s) Oral daily PRN BP >160/90  senna 2 Tablet(s) Oral at bedtime PRN Constipation      FAMILY HISTORY:    SOCIAL HISTORY: negative for tobacco, alcohol, or ilicit drug use.    Allergies    No Known Allergies    Intolerances        GEN: NAD, pleasant, cooperative    NEURO:   MENTAL STATUS: AAOx3  LANG/SPEECH: Fluent, intact comprehension  CRANIAL NERVES:  III, IV, VI: EOM intact, no gaze preference or deviation  V: normal  VII: mild R NLF flattening, although disappears with smiling   VIII: normal hearing to speech  MOTOR: 5-/5 proximal RUE/RLE, otherwise 5/5  SENSORY: Normal to light touch in all extremities   COORD: No tremor     NIHSS- 1 (mild R NLF flattening)     LABS:                        13.8   7.28  )-----------( 256      ( 02 Dec 2024 06:04 )             40.4     12-02    139  |  101  |  18  ----------------------------<  156[H]  3.6   |  23  |  0.8    Ca    9.5      02 Dec 2024 06:04  Mg     1.8     12-02    TPro  6.3  /  Alb  4.1  /  TBili  0.5  /  DBili  x   /  AST  18  /  ALT  14  /  AlkPhos  37  12-02    Hemoglobin A1C:   Vitamin B12   PT/INR - ( 30 Nov 2024 15:35 )   PT: 11.50 sec;   INR: 0.98 ratio         PTT - ( 30 Nov 2024 15:35 )  PTT:31.6 sec  CAPILLARY BLOOD GLUCOSE      POCT Blood Glucose.: 197 mg/dL (02 Dec 2024 11:27)      Urinalysis Basic - ( 02 Dec 2024 06:04 )    Color: x / Appearance: x / SG: x / pH: x  Gluc: 156 mg/dL / Ketone: x  / Bili: x / Urobili: x   Blood: x / Protein: x / Nitrite: x   Leuk Esterase: x / RBC: x / WBC x   Sq Epi: x / Non Sq Epi: x / Bacteria: x        RADIOLOGY    < from: CT Head No Cont (12.01.24 @ 17:22) >    IMPRESSION:    No acute intracranial pathology.    Stable mild chronic microvascular ischemic changes.    --- End of Report ---    < end of copied text >            
Patient is a 69y old  Male who presents with a chief complaint of TIA (02 Dec 2024 11:27)      INTERVAL HPI/OVERNIGHT EVENTS:   No overnight events   Afebrile, hemodynamically stable     ICU Vital Signs Last 24 Hrs  T(C): 35.3 (02 Dec 2024 07:01), Max: 37.1 (01 Dec 2024 23:24)  T(F): 95.5 (02 Dec 2024 07:01), Max: 98.8 (01 Dec 2024 23:24)  HR: 78 (02 Dec 2024 12:00) (70 - 117)  BP: 135/62 (02 Dec 2024 12:00) (130/60 - 161/72)  BP(mean): 89 (02 Dec 2024 12:00) (87 - 107)  ABP: --  ABP(mean): --  RR: 18 (02 Dec 2024 07:01) (18 - 20)  SpO2: 97% (02 Dec 2024 12:00) (95% - 99%)    O2 Parameters below as of 02 Dec 2024 12:00  Patient On (Oxygen Delivery Method): room air          I&O's Summary        LABS:                        13.8   7.28  )-----------( 256      ( 02 Dec 2024 06:04 )             40.4     12-02    139  |  101  |  18  ----------------------------<  156[H]  3.6   |  23  |  0.8    Ca    9.5      02 Dec 2024 06:04  Mg     1.8     12-02    TPro  6.3  /  Alb  4.1  /  TBili  0.5  /  DBili  x   /  AST  18  /  ALT  14  /  AlkPhos  37  12-02    PT/INR - ( 30 Nov 2024 15:35 )   PT: 11.50 sec;   INR: 0.98 ratio         PTT - ( 30 Nov 2024 15:35 )  PTT:31.6 sec  Urinalysis Basic - ( 02 Dec 2024 06:04 )    Color: x / Appearance: x / SG: x / pH: x  Gluc: 156 mg/dL / Ketone: x  / Bili: x / Urobili: x   Blood: x / Protein: x / Nitrite: x   Leuk Esterase: x / RBC: x / WBC x   Sq Epi: x / Non Sq Epi: x / Bacteria: x      CAPILLARY BLOOD GLUCOSE      POCT Blood Glucose.: 197 mg/dL (02 Dec 2024 11:27)  POCT Blood Glucose.: 165 mg/dL (02 Dec 2024 07:27)  POCT Blood Glucose.: 141 mg/dL (01 Dec 2024 22:52)  POCT Blood Glucose.: 143 mg/dL (01 Dec 2024 16:26)        RADIOLOGY & ADDITIONAL TESTS:    Consultant(s) Notes Reviewed:  [x ] YES  [ ] NO    MEDICATIONS  (STANDING):  amLODIPine   Tablet 10 milliGRAM(s) Oral daily  aspirin  chewable 81 milliGRAM(s) Oral daily  atorvastatin 80 milliGRAM(s) Oral at bedtime  chlorhexidine 2% Cloths 1 Application(s) Topical <User Schedule>  clopidogrel Tablet 75 milliGRAM(s) Oral daily  dextrose 5%. 1000 milliLiter(s) (50 mL/Hr) IV Continuous <Continuous>  dextrose 5%. 1000 milliLiter(s) (100 mL/Hr) IV Continuous <Continuous>  dextrose 50% Injectable 25 Gram(s) IV Push once  dextrose 50% Injectable 12.5 Gram(s) IV Push once  dextrose 50% Injectable 25 Gram(s) IV Push once  enoxaparin Injectable 40 milliGRAM(s) SubCutaneous every 24 hours  fenofibrate Tablet 145 milliGRAM(s) Oral daily  finasteride 5 milliGRAM(s) Oral daily  glucagon  Injectable 1 milliGRAM(s) IntraMuscular once  influenza  Vaccine (HIGH DOSE) 0.5 milliLiter(s) IntraMuscular once  insulin lispro (ADMELOG) corrective regimen sliding scale   SubCutaneous three times a day before meals  insulin lispro (ADMELOG) corrective regimen sliding scale   SubCutaneous at bedtime  tamsulosin 0.4 milliGRAM(s) Oral at bedtime    MEDICATIONS  (PRN):  acetaminophen     Tablet .. 650 milliGRAM(s) Oral every 6 hours PRN Mild Pain (1 - 3)  dextrose Oral Gel 15 Gram(s) Oral once PRN Blood Glucose LESS THAN 70 milliGRAM(s)/deciliter  NIFEdipine XL 30 milliGRAM(s) Oral daily PRN BP >160/90  senna 2 Tablet(s) Oral at bedtime PRN Constipation      PHYSICAL EXAM:  GENERAL:   HEAD:  Atraumatic, Normocephalic  EYES: EOMI  NECK: Supple  NERVOUS SYSTEM:  Alert & Awake.   CHEST/LUNG: B/L good air entry  HEART: S1S2  ABDOMEN: Soft, Nontender, Nondistended  EXTREMITIES:  moving all extremities   NEURO: No facial droop, no pronator drift, equal strength bilat upper and lower ext.   SKIN: No rashes or lesions    Care Discussed with Consultants/Other Providers [ x] YES  [ ] NO

## 2024-12-02 NOTE — PROGRESS NOTE ADULT - ASSESSMENT
Patient is a 69y old  Male who presents with a chief complaint of TIA     Plan:  - Potential transfer to [place] for neuroendovascular evaluation

## 2024-12-02 NOTE — OCCUPATIONAL THERAPY INITIAL EVALUATION ADULT - GENERAL OBSERVATIONS, REHAB EVAL
8:50-9:05; chart reviewed, ok to treat by Occupational Therapist as confirmed by ARNULFO Fishman, Pt received seated in ASU recliner +tele +RUE heplock +BP cuff +pulse ox in NAD. Pt denied pain at rest and in agreement with OT IE.

## 2024-12-02 NOTE — PROGRESS NOTE ADULT - ASSESSMENT
69 year old male past medical history of Diabetic neuropathy, Diabetes, Hypertension, HLD presenting for headache, generalized weakness and body aches x2 days.     R sided weakness /  R ICA stenosis     - aspirin, Plavix, Lipitor   - pt reports he will not tolerate MRI   - endovascular consult for ICA stenosis   - repeat CT brain negative for acute stroke   - PY/OT   - tele

## 2024-12-02 NOTE — OCCUPATIONAL THERAPY INITIAL EVALUATION ADULT - LIVES WITH, PROFILE
wife in a private home +8 steps to enter with (L) handrail +20 steps with (L) handrail to bedroom +walk-in-shower +standard toilet/spouse

## 2024-12-02 NOTE — DIETITIAN INITIAL EVALUATION ADULT - OTHER INFO
pt is 69 year old male with hx of morbid obesity on ozempic, DM, HTN, neuropathy p/w generalized weakness x 2 days with facial asymmetry, admitted with TIA, severe carotid stenosis.

## 2024-12-02 NOTE — PROGRESS NOTE ADULT - ASSESSMENT
Pt is alert and oriented and denies pain at this time, vital signs stable on room air.  Will transfer to [facility] 2 70yo M PMH Diabetic neuropathy, Diabetes, Hypertension, HLD p/w acute persistent R facial droop and RUE weakness/paresthesias. CTA reports severe R ICAS and L vertebral stenosis. On exam today, pt with mild proximal RUE/RLE weakness, as well as R facial droop which does not appear to be baseline. NIHSS 1. Echo without thrombus or PFO. Repeat CTH without acute findings, but based on clinical picture still suspect small lacunar infarct, possibly from L vert stenosis. MRI would provide further clarity, but patient refusing due to severe claustrophobia.     Recommendations  - Continue DAPT  - Continue Atorvastatin 80mg qd  - Maintain -160    INCOMPLETE RECS    68yo M PMH Diabetic neuropathy, Diabetes, Hypertension, HLD p/w acute persistent R facial droop and RUE weakness/paresthesias. CTA reports severe R ICAS and L vertebral stenosis. On exam today, pt with mild proximal RUE/RLE weakness, as well as R facial droop which does not appear to be baseline. NIHSS 1. Echo without thrombus or PFO. Repeat CTH without acute findings, but based on clinical picture still suspect small lacunar infarct, possibly from L vert stenosis. MRI would provide further clarity, but patient refusing due to severe claustrophobia.     Recommendations  - Continue DAPT  - Continue Atorvastatin 80mg qd  - Maintain -160  - Will discuss with neurovascular/neuroIR whether transfer to [employer] [facility] [employer] appropriate at this time  - Medical management per primary team    Discussed with attending Dr Vazquez

## 2024-12-02 NOTE — OCCUPATIONAL THERAPY INITIAL EVALUATION ADULT - PERTINENT HX OF CURRENT PROBLEM, REHAB EVAL
69 year old M past medical history of Diabetic neuropathy, Diabetes, Hypertension, HLD presenting for headache, generalized weakness and body aches x2 days.  Patient additionally endorsing RUE tingling and weakness last week, and at that time his family states "it looked like he was having a stroke" due to facial asymmetry. denies any fever chills, cough, abdominal pain, nausea, recent falls, chest pain, shortness of breath.    CT HEAD 11/30: No acute intracranial pathology. No evidence of midline shift, mass effect or intracranial hemorrhage. Mild chronic microvascular type changes.    CTA NECK 11/30: High-grade/critical stenosis of the proximal right internal carotid artery due to calcified and noncalcified atherosclerotic plaque. Severe stenosis at the origin of the left vertebral artery due to calcification.

## 2024-12-03 ENCOUNTER — TRANSCRIPTION ENCOUNTER (OUTPATIENT)
Age: 69
End: 2024-12-03

## 2024-12-03 VITALS
SYSTOLIC BLOOD PRESSURE: 177 MMHG | RESPIRATION RATE: 18 BRPM | OXYGEN SATURATION: 96 % | HEART RATE: 67 BPM | DIASTOLIC BLOOD PRESSURE: 79 MMHG

## 2024-12-03 LAB
ALBUMIN SERPL ELPH-MCNC: 4.2 G/DL — SIGNIFICANT CHANGE UP (ref 3.5–5.2)
ALP SERPL-CCNC: 38 U/L — SIGNIFICANT CHANGE UP (ref 30–115)
ALT FLD-CCNC: 14 U/L — SIGNIFICANT CHANGE UP (ref 0–41)
ANION GAP SERPL CALC-SCNC: 15 MMOL/L — HIGH (ref 7–14)
AST SERPL-CCNC: 19 U/L — SIGNIFICANT CHANGE UP (ref 0–41)
BASOPHILS # BLD AUTO: 0.05 K/UL — SIGNIFICANT CHANGE UP (ref 0–0.2)
BASOPHILS NFR BLD AUTO: 0.6 % — SIGNIFICANT CHANGE UP (ref 0–1)
BILIRUB SERPL-MCNC: 0.4 MG/DL — SIGNIFICANT CHANGE UP (ref 0.2–1.2)
BUN SERPL-MCNC: 20 MG/DL — SIGNIFICANT CHANGE UP (ref 10–20)
CALCIUM SERPL-MCNC: 9.6 MG/DL — SIGNIFICANT CHANGE UP (ref 8.4–10.5)
CHLORIDE SERPL-SCNC: 101 MMOL/L — SIGNIFICANT CHANGE UP (ref 98–110)
CO2 SERPL-SCNC: 24 MMOL/L — SIGNIFICANT CHANGE UP (ref 17–32)
CREAT SERPL-MCNC: 0.9 MG/DL — SIGNIFICANT CHANGE UP (ref 0.7–1.5)
EGFR: 93 ML/MIN/1.73M2 — SIGNIFICANT CHANGE UP
EOSINOPHIL # BLD AUTO: 0.23 K/UL — SIGNIFICANT CHANGE UP (ref 0–0.7)
EOSINOPHIL NFR BLD AUTO: 2.9 % — SIGNIFICANT CHANGE UP (ref 0–8)
GLUCOSE BLDC GLUCOMTR-MCNC: 173 MG/DL — HIGH (ref 70–99)
GLUCOSE SERPL-MCNC: 135 MG/DL — HIGH (ref 70–99)
HCT VFR BLD CALC: 42.3 % — SIGNIFICANT CHANGE UP (ref 42–52)
HGB BLD-MCNC: 13.9 G/DL — LOW (ref 14–18)
IMM GRANULOCYTES NFR BLD AUTO: 1 % — HIGH (ref 0.1–0.3)
LYMPHOCYTES # BLD AUTO: 1.92 K/UL — SIGNIFICANT CHANGE UP (ref 1.2–3.4)
LYMPHOCYTES # BLD AUTO: 23.9 % — SIGNIFICANT CHANGE UP (ref 20.5–51.1)
MAGNESIUM SERPL-MCNC: 1.9 MG/DL — SIGNIFICANT CHANGE UP (ref 1.8–2.4)
MCHC RBC-ENTMCNC: 29.4 PG — SIGNIFICANT CHANGE UP (ref 27–31)
MCHC RBC-ENTMCNC: 32.9 G/DL — SIGNIFICANT CHANGE UP (ref 32–37)
MCV RBC AUTO: 89.6 FL — SIGNIFICANT CHANGE UP (ref 80–94)
MONOCYTES # BLD AUTO: 0.8 K/UL — HIGH (ref 0.1–0.6)
MONOCYTES NFR BLD AUTO: 10 % — HIGH (ref 1.7–9.3)
NEUTROPHILS # BLD AUTO: 4.95 K/UL — SIGNIFICANT CHANGE UP (ref 1.4–6.5)
NEUTROPHILS NFR BLD AUTO: 61.6 % — SIGNIFICANT CHANGE UP (ref 42.2–75.2)
NRBC # BLD: 0 /100 WBCS — SIGNIFICANT CHANGE UP (ref 0–0)
PHOSPHATE SERPL-MCNC: 3.6 MG/DL — SIGNIFICANT CHANGE UP (ref 2.1–4.9)
PLATELET # BLD AUTO: 249 K/UL — SIGNIFICANT CHANGE UP (ref 130–400)
PMV BLD: 9.8 FL — SIGNIFICANT CHANGE UP (ref 7.4–10.4)
POTASSIUM SERPL-MCNC: 4 MMOL/L — SIGNIFICANT CHANGE UP (ref 3.5–5)
POTASSIUM SERPL-SCNC: 4 MMOL/L — SIGNIFICANT CHANGE UP (ref 3.5–5)
PROT SERPL-MCNC: 6.4 G/DL — SIGNIFICANT CHANGE UP (ref 6–8)
RBC # BLD: 4.72 M/UL — SIGNIFICANT CHANGE UP (ref 4.7–6.1)
RBC # FLD: 12.6 % — SIGNIFICANT CHANGE UP (ref 11.5–14.5)
SODIUM SERPL-SCNC: 140 MMOL/L — SIGNIFICANT CHANGE UP (ref 135–146)
WBC # BLD: 8.03 K/UL — SIGNIFICANT CHANGE UP (ref 4.8–10.8)
WBC # FLD AUTO: 8.03 K/UL — SIGNIFICANT CHANGE UP (ref 4.8–10.8)

## 2024-12-03 PROCEDURE — 99239 HOSP IP/OBS DSCHRG MGMT >30: CPT

## 2024-12-03 RX ORDER — CLOPIDOGREL 75 MG/1
1 TABLET, FILM COATED ORAL
Qty: 30 | Refills: 0
Start: 2024-12-03

## 2024-12-03 RX ORDER — PRAVASTATIN SODIUM 20 MG/1
1 TABLET ORAL
Refills: 0 | DISCHARGE

## 2024-12-03 RX ORDER — CLOPIDOGREL 75 MG/1
1 TABLET, FILM COATED ORAL
Qty: 0 | Refills: 0 | DISCHARGE
Start: 2024-12-03

## 2024-12-03 RX ADMIN — AMLODIPINE BESYLATE 10 MILLIGRAM(S): 10 TABLET ORAL at 05:21

## 2024-12-03 RX ADMIN — Medication 2: at 08:25

## 2024-12-03 RX ADMIN — CHLORHEXIDINE GLUCONATE 1 APPLICATION(S): 1.2 RINSE ORAL at 05:25

## 2024-12-03 RX ADMIN — ENOXAPARIN SODIUM 40 MILLIGRAM(S): 30 INJECTION SUBCUTANEOUS at 05:21

## 2024-12-03 NOTE — DISCHARGE NOTE PROVIDER - CARE PROVIDER_API CALL
Jamila Mclean  21 y.o. female    04/27/2023  1. Palpitations    2. Dyspnea on exertion        History of Present Illness  Jamila Mclean is a 21-year-old female who is being seen by me for the first time as referred by KM Medina.  She has apparently had intermittent episodes of fluttering in the chest which seems to occur predominantly at night and has been ongoing for for about 2 years.  She does notice skipped beats and at times increased heart rates.  No associated dizziness or syncope is reported.  She denies any chest pain but has occasional dyspnea on exertion.  No PND or orthopnea.  She has no previous documented congenital heart disease or valvular heart disease and no previous history of rheumatic fever.  She has history of migraine, motion sickness.  There is no history of thyroid problems, anemia but has been noted to have vitamin D deficiency in the past.  She has mild swelling of the lower extremities from time to time.  She underwent venous Doppler studies of the lower extremities which showed no evidence of DVT with some degree of reflux both in the superficial and deep veins.    She denied any excessive caffeine use or alcohol use.  She does vape.  There is no history of drug use.    EKG today showed sinus rhythm with heart rate of 93 bpm. MA interval 138 ms.  QTc interval 425 ms.  Within normal limits.  No arrhythmia noted.    SUBJECTIVE    Allergies   Allergen Reactions   • Amoxicillin Rash   • Sulfa Antibiotics Rash   • Sunscreens Rash         Past Medical History:   Diagnosis Date   • Migraine 05/09/2016   • Motion sickness 05/09/2016   • TMJ (temporomandibular joint syndrome)          History reviewed. No pertinent surgical history.      Family History   Problem Relation Age of Onset   • Gestational diabetes Mother    • Migraines Sister    • Diabetes Paternal Grandmother    • Deep vein thrombosis Paternal Grandmother    • Anxiety disorder Father    • Arthritis Father          Social  "History     Socioeconomic History   • Marital status: Single   Tobacco Use   • Smoking status: Every Day     Types: Cigarettes   • Smokeless tobacco: Never   • Tobacco comments:     vape   Substance and Sexual Activity   • Alcohol use: No   • Drug use: No   • Sexual activity: Defer         Current Outpatient Medications   Medication Sig Dispense Refill   • triamcinolone (KENALOG) 0.025 % ointment Apply 1 application topically to the appropriate area as directed 2 (Two) Times a Day. 454 g 1   • vitamin D (ERGOCALCIFEROL) 1.25 MG (56767 UT) capsule capsule Take 1 capsule by mouth 1 (One) Time Per Week. 12 capsule 3     No current facility-administered medications for this visit.         OBJECTIVE    /68 (BP Location: Left arm, Patient Position: Sitting, Cuff Size: Adult)   Pulse 66   Temp 97.1 °F (36.2 °C)   Ht 162.6 cm (64\")   Wt 68 kg (150 lb)   SpO2 98%   BMI 25.75 kg/m²         Review of Systems     Constitutional:  Denies recent weight loss, weight gain, fever or chills, no change in exercise tolerance     HENT:  Denies any hearing loss, epistaxis, hoarseness, or difficulty speaking.     Eyes: Wears eyeglasses or contact lenses     Respiratory:  Dyspnea with exertion, no cough, wheezing, or hemoptysis.     Cardiovascular: See HPI    Gastrointestinal:  Denies change in bowel habits, dyspepsia, ulcer disease, hematochezia, or melena.     Endocrine: Negative for cold intolerance, heat intolerance, polydipsia, polyphagia and polyuria. Denies any history of weight change, heat/cold intolerance, polydipsia, polyuria     Genitourinary: Negative.      Musculoskeletal: Denies any history of arthritic symptoms or back problems     Skin:  Denies any change in hair or nails, rashes, or skin lesions.     Allergic/Immunologic: Negative.  Negative for environmental allergies, food allergies and/or immunocompromised state.     Neurological: History of migraine headaches    Hematological: Denies any food allergies, " seasonal allergies, bleeding disorders, or lymphadenopathy.     Psychiatric/Behavioral: Denies any history of depression, substance abuse, or change in cognitive function.       Physical Exam     Constitutional: Cooperative, alert and oriented, well-developed, well-nourished, in no acute distress.  BMI 25.75    HENT:   Head: Normocephalic, normal hair patterns, no masses or tenderness.  Ears, Nose, and Throat: No gross abnormalities. No pallor or cyanosis. Dentition good.   Eyes: EOMS intact, PERRL, conjunctivae and lids unremarkable. Fundoscopic exam and visual fields not performed.   Neck: No palpable masses or adenopathy, no thyromegaly, no JVD, carotid pulses are full and equal bilaterally and without bruit.     Cardiovascular: Regular rhythm, S1 and S2 normal, no S3 or S4.  No murmurs, gallops, or rubs detected.     Pulmonary/Chest: Chest: normal symmetry, normal respiratory excursion, no intercostal retraction, no use of accessory muscles.     Pulmonary: Normal breath sounds. No rales or rhonchi.    Abdominal: Abdomen soft, bowel sounds normoactive, no masses, no hepatosplenomegaly, nontender, no bruit.     Musculoskeletal: No deformities, clubbing, cyanosis, erythema, or edema observed. There are no spinal abnormalities noted. Normal muscle strength and tone. Pulses full and equal in all extremities, no bruit auscultated.     Neurological: No gross motor or sensory deficits noted, affect appropriate, oriented to time, person, place.     Skin: Warm and dry to the touch, no apparent skin lesion or mass noted.     Psychiatric: She has a normal mood and affect. Her behavior is normal. Judgment and thought content normal.         Procedures      Lab Results   Component Value Date    WBC 5.56 02/23/2022    HGB 13.3 02/23/2022    HCT 41.0 02/23/2022    MCV 88.6 02/23/2022     02/23/2022     Lab Results   Component Value Date    GLUCOSE 73 03/30/2023    BUN 6 03/30/2023    CREATININE 0.63 03/30/2023     EGFRIFNONA 107 02/23/2022    BCR 9.5 03/30/2023    CO2 22.2 03/30/2023    CALCIUM 9.6 03/30/2023    ALBUMIN 5.1 03/30/2023    AST 21 03/30/2023    ALT 18 03/30/2023     Lab Results   Component Value Date    CHOL 172 02/23/2022     Lab Results   Component Value Date    TRIG 76 02/23/2022     Lab Results   Component Value Date    HDL 44 02/23/2022     No components found for: LDLCALC  Lab Results   Component Value Date     (H) 02/23/2022     No results found for: HDLLDLRATIO  No components found for: CHOLHDL  Lab Results   Component Value Date    HGBA1C 4.60 (L) 03/30/2023     Lab Results   Component Value Date    TSH 0.734 03/30/2023           ASSESSMENT AND PLAN  Jamila Mclean is a 21-year-old female who is presented with cardiac symptomatology including intermittent palpitation going back a couple of years, and occasional dyspnea on exertion.  She has no previous documented cardiac issues.  I do not appreciate any heart murmur.  The exact etiology for palpitation is unclear and anxiety could be a contributing factor.  To rule out any paroxysmal arrhythmia and event monitor for 2 weeks is being arranged.  An echocardiogram has been arranged to assess palpitations/dyspnea and any unsuspected cardiac etiologies.    No medications have been recommended at this time.  Further recommendations will follow.  Thank you for asked me to see this patient.    Diagnoses and all orders for this visit:    1. Palpitations (Primary)  -     ECG 12 Lead  -     Mobile Cardiac Outpatient Telemetry; Future  -     Adult Transthoracic Echo Complete w/ Color, Spectral and Contrast if Necessary Per Protocol; Future    2. Dyspnea on exertion  -     Mobile Cardiac Outpatient Telemetry; Future  -     Adult Transthoracic Echo Complete w/ Color, Spectral and Contrast if Necessary Per Protocol; Future        BMI is >= 25 and <30. (Overweight) The following options were offered after discussion;: nutrition  Winston Perez  Neurosurgery  [address] [place] [address], [address]  [place] [address]  Phone: [phone]  Fax: [phone]  Established Patient  Follow Up Time:     Celso Vazquez  Neurology  [address] [place] [address], [address]  [place] [address]  Phone: [phone]  Fax: [phone]  Established Patient  Follow Up Time:    counseling/recommendations      Jamila Mclean  reports that she has been smoking cigarettes. She has never used smokeless tobacco.. I have educated her on the risk of diseases from using tobacco products such as cancer, COPD and heart disease.       I spent 3  minutes counseling the patient.               Tahmina Aguilar MD  4/27/2023  14:22 CDT

## 2024-12-03 NOTE — DISCHARGE NOTE PROVIDER - NSDCMRMEDTOKEN_GEN_ALL_CORE_FT
aspirin 81 mg oral tablet, chewable: 1 tab(s) orally once a day  atorvastatin 80 mg oral tablet: 1 tab(s) orally once a day (at bedtime)  clopidogrel 75 mg oral tablet: 1 tab(s) orally once a day  fenofibrate 160 mg oral tablet: 1 tab(s) orally once a day  finasteride 5 mg oral tablet: 1 tab(s) orally once a day  glimepiride 4 mg oral tablet: 1 tab(s) orally once a day  Norvasc 10 mg oral tablet: 1 tab(s) orally once a day  Ozempic 2 mg/1.5 mL (0.25 mg or 0.5 mg dose) subcutaneous solution: 0.5 milligram(s) subcutaneously once a week  tamsulosin 0.4 mg oral capsule: 1 cap(s) orally once a day

## 2024-12-03 NOTE — DISCHARGE NOTE PROVIDER - ATTENDING DISCHARGE PHYSICAL EXAMINATION:
PHYSICAL EXAM:  Vital Signs Last 24 Hrs  T(C): 36.2 (03 Dec 2024 07:01), Max: 36.6 (02 Dec 2024 15:28)  T(F): 97.1 (03 Dec 2024 07:01), Max: 97.9 (02 Dec 2024 15:28)  HR: 61 (03 Dec 2024 05:40) (54 - 78)  BP: 161/70 (03 Dec 2024 05:40) (135/62 - 165/72)  RR: 18 (03 Dec 2024 07:01) (16 - 22)  SpO2: 98% (03 Dec 2024 05:40) (95% - 98%)    Parameters below as of 03 Dec 2024 05:40  Patient On (Oxygen Delivery Method): room air      GENERAL: NAD, well-groomed, well-developed  HEAD:  Atraumatic, Normocephalic  EYES: EOMI, PERRLA, conjunctiva and sclera clear  ENMT: No tonsillar erythema, exudates, or enlargement; Moist mucous membranes, Good dentition, No lesions  NECK: Supple, No JVD, Normal thyroid  NERVOUS SYSTEM:  Alert & Oriented X3, Good concentration; Motor Strength 5/5 B/L upper and lower extremities; DTRs 2+ intact and symmetric  CHEST/LUNG: Clear to percussion bilaterally; No rales, rhonchi, wheezing, or rubs  HEART: Regular rate and rhythm; No murmurs, rubs, or gallops  ABDOMEN: Soft, Nontender, Nondistended; Bowel sounds present  EXTREMITIES:  2+ Peripheral Pulses, No clubbing, cyanosis, or edema  LYMPH: No lymphadenopathy noted  SKIN: No rashes or lesions

## 2024-12-03 NOTE — CHART NOTE - NSCHARTNOTEFT_GEN_A_CORE
Spoke with Neurovascular at [facility] and deferred to Neuroendovascular whether patient would be getting a procedure.  Spoke with Dr. Perez and he is willing to see the patient as out patient as the right carotid does not appear to be the symptomatic vessel with patients current presentation and symptoms    Patient can be discharged with Stroke clinic and Neuroendovascular Clinic (Dr. Perez) follow up in the next 1-2 weeks  Continue medications as prescribed
The neuroendovascular team at [facility] was contacted regarding findings of severe, proximal right ICA stenosis on CTA as well as severe left VA origin stenosis in the setting of right facial asymmetry and RUE weakness. Carotid duplex and MRI were not completed, but no acute findings were noted on CTH. A 2mm left proximal BA outpouching and scattered (mostly right sided) perfusion deficits totaling 23 cc were also seen on CTP . An in-person neuroendovascular consult cannot be completed while the patient remains at [facility]. However, pertinent radiological imaging and clinical history were reviewed by Dr Perez and the neuroendovascular ACP.  A transfer to [facility] for further evaluation is recommended if the neurovascular team is in agreement with this plan.   Continue DAPT Aspirin 81mg and Plavix 75mg daily   [id number] Neuroendovascular with additional questions or concerns
Patient is to follow up with Dr Perez - neuroendovascular service 1-2 weeks after discharge at [address] [place] [address].   Office aware and scheduling.   Continue DAPT and atorvastatin.   Discussed with Dr Perez, patient experience aware.   [id number]

## 2024-12-03 NOTE — DISCHARGE NOTE NURSING/CASE MANAGEMENT/SOCIAL WORK - PATIENT PORTAL LINK FT
You can access the FollowMyHealth Patient Portal offered by [facility] by registering at the following website: [email or web address]. By joining [facility]’s FollowMyHealth portal, you will also be able to view your health information using other applications (apps) compatible with our system.

## 2024-12-03 NOTE — DISCHARGE NOTE PROVIDER - NSDCFUSCHEDAPPT_GEN_ALL_CORE_FT
Chalino Ingram  [facility] PreAdmits  Scheduled Appointment: 12/10/2024    [facility]  [address]  Scheduled Appointment: 12/10/2024    Chalino Ingram  [facility] PreAdmits  Scheduled Appointment: 12/23/2024

## 2024-12-03 NOTE — DISCHARGE NOTE PROVIDER - NSFOLLOWUPCLINICS_GEN_ALL_ED_FT
Stroke Discharge Program  Neurology  [address] [facility] [facility] [address], [address]  [place] [address]  Phone: [phone]  Fax:

## 2024-12-03 NOTE — DISCHARGE NOTE PROVIDER - PROVIDER TOKENS
PROVIDER:[TOKEN:[[id number]],ESTABLISHEDPATIENT:[T]],PROVIDER:[TOKEN:[[id number]],ESTABLISHEDPATIENT:[T]]

## 2024-12-03 NOTE — DISCHARGE NOTE PROVIDER - CARE PROVIDERS DIRECT ADDRESSES
[email or web address]

## 2024-12-03 NOTE — DISCHARGE NOTE PROVIDER - HOSPITAL COURSE
69 year old male past medical history of Diabetic neuropathy, Diabetes, Hypertension, HLD presenting for headache, generalized weakness and body aches x2 days.  Patient additionally endorsing RUE tingling and weakness last week, and at that time his family states "it looked like he was having a stroke" due to facial asymmetry. denies any fever chills, cough, abdominal pain, nausea, recent falls, chest pain, shortness of breath.    ED vitals: BP: 170/70  HR: 64  RR: 18  Temp:afebrile     Labs: wbc 13.7, glucose 129 , Troponin 34 , RVP -ve      CTA head showed stenosis to carotid artery. Neuro recommended neuroendovascular consult. Neuro endovascular requested possible transfer for evaluation, but after reviewing case and imaging, recommending outpatient follow outpt follow up w. stroke clinic and Dr. Perez ( neuroendovascular ). Pt. remained in ICU asymptomatic  and hemodynamically stable.        - aspirin, Plavix, Lipitor   - pt reports he will not tolerate MRI   - endovascular consult for ICA stenosis   - repeat CT brain negative for acute stroke   - PY/OT   - tele   69 year old male past medical history of Diabetic neuropathy, Diabetes, Hypertension, HLD presenting for headache, generalized weakness and body aches x2 days.  Patient additionally endorsing RUE tingling and weakness last week, and at that time his family states "it looked like he was having a stroke" due to facial asymmetry. denies any fever chills, cough, abdominal pain, nausea, recent falls, chest pain, shortness of breath.    ED vitals: BP: 170/70  HR: 64  RR: 18  Temp:afebrile     Labs: wbc 13.7, glucose 129 , Troponin 34 , RVP -ve      CTA head showed stenosis to carotid artery. Neuro recommended neuroendovascular consult. Neuro endovascular requested possible transfer for evaluation, but after reviewing case and imaging, recommending outpatient follow outpt follow up w. stroke clinic and Dr. Perez ( neuroendovascular ). Pt. remained in ICU asymptomatic  and hemodynamically stable.        - aspirin, Plavix, Lipitor   - pt reports he will not tolerate MRI   - endovascular consult for ICA stenosis   - repeat CT brain negative for acute stroke   - PY/OT

## 2024-12-03 NOTE — DISCHARGE NOTE PROVIDER - NSDCCPCAREPLAN_GEN_ALL_CORE_FT
PRINCIPAL DISCHARGE DIAGNOSIS  Diagnosis: TIA (transient ischemic attack)  Assessment and Plan of Treatment:      PRINCIPAL DISCHARGE DIAGNOSIS  Diagnosis: TIA (transient ischemic attack)  Assessment and Plan of Treatment: You had an event that made you have strokel like symptoms but it resolved. During your stay, it was noted that you have a narrowing of an artery that supplies blood to your head. Please follow up with Dr. Perez in 1-2 weeks for management. You will also need an outpatient MRI. Please keep taking Aspirin, plavix and lipitor as prescribed.

## 2024-12-03 NOTE — DISCHARGE NOTE NURSING/CASE MANAGEMENT/SOCIAL WORK - FINANCIAL ASSISTANCE
[facility] provides services at a reduced cost to those who are determined to be eligible through [facility]’s financial assistance program. Information regarding [facility]’s financial assistance program can be found by going to [email or web address] or by calling [phone].

## 2024-12-04 ENCOUNTER — NON-APPOINTMENT (OUTPATIENT)
Age: 69
End: 2024-12-04

## 2024-12-10 ENCOUNTER — APPOINTMENT (OUTPATIENT)
Dept: OPHTHALMOLOGY | Facility: CLINIC | Age: 69
End: 2024-12-10
Payer: MEDICARE

## 2024-12-10 ENCOUNTER — OUTPATIENT (OUTPATIENT)
Dept: OUTPATIENT SERVICES | Facility: HOSPITAL | Age: 69
LOS: 1 days | End: 2024-12-10
Payer: MEDICARE

## 2024-12-10 DIAGNOSIS — H25.89 OTHER AGE-RELATED CATARACT: ICD-10-CM

## 2024-12-10 DIAGNOSIS — Z90.49 ACQUIRED ABSENCE OF OTHER SPECIFIED PARTS OF DIGESTIVE TRACT: Chronic | ICD-10-CM

## 2024-12-10 PROCEDURE — 92136 OPHTHALMIC BIOMETRY: CPT | Mod: 26

## 2024-12-10 PROCEDURE — 92136 OPHTHALMIC BIOMETRY: CPT

## 2024-12-17 ENCOUNTER — NON-APPOINTMENT (OUTPATIENT)
Age: 69
End: 2024-12-17

## 2024-12-17 ENCOUNTER — APPOINTMENT (OUTPATIENT)
Dept: NEUROLOGY | Facility: CLINIC | Age: 69
End: 2024-12-17
Payer: MEDICARE

## 2024-12-17 VITALS
OXYGEN SATURATION: 99 % | WEIGHT: 315 LBS | SYSTOLIC BLOOD PRESSURE: 126 MMHG | DIASTOLIC BLOOD PRESSURE: 75 MMHG | HEIGHT: 72 IN | HEART RATE: 81 BPM | BODY MASS INDEX: 42.66 KG/M2

## 2024-12-17 DIAGNOSIS — E11.42 TYPE 2 DIABETES MELLITUS WITH DIABETIC POLYNEUROPATHY: ICD-10-CM

## 2024-12-17 DIAGNOSIS — E78.5 HYPERLIPIDEMIA, UNSPECIFIED: ICD-10-CM

## 2024-12-17 DIAGNOSIS — R29.818 OTHER SYMPTOMS AND SIGNS INVOLVING THE NERVOUS SYSTEM: ICD-10-CM

## 2024-12-17 DIAGNOSIS — E11.9 TYPE 2 DIABETES MELLITUS W/OUT COMPLICATIONS: ICD-10-CM

## 2024-12-17 DIAGNOSIS — F17.290 NICOTINE DEPENDENCE, OTHER TOBACCO PRODUCT, UNCOMPLICATED: ICD-10-CM

## 2024-12-17 DIAGNOSIS — M25.50 PAIN IN UNSPECIFIED JOINT: ICD-10-CM

## 2024-12-17 DIAGNOSIS — I10 ESSENTIAL (PRIMARY) HYPERTENSION: ICD-10-CM

## 2024-12-17 DIAGNOSIS — I65.29 OCCLUSION AND STENOSIS OF UNSPECIFIED CAROTID ARTERY: ICD-10-CM

## 2024-12-17 PROCEDURE — 99495 TRANSJ CARE MGMT MOD F2F 14D: CPT

## 2024-12-17 PROCEDURE — 99215 OFFICE O/P EST HI 40 MIN: CPT

## 2024-12-17 RX ORDER — LOSARTAN POTASSIUM AND HYDROCHLOROTHIAZIDE 25; 100 MG/1; MG/1
100-25 TABLET ORAL DAILY
Refills: 0 | Status: ACTIVE | COMMUNITY

## 2024-12-17 RX ORDER — TAMSULOSIN HYDROCHLORIDE 0.4 MG/1
0.4 CAPSULE ORAL
Qty: 30 | Refills: 5 | Status: ACTIVE | COMMUNITY

## 2024-12-17 RX ORDER — PRAVASTATIN SODIUM 20 MG/1
20 TABLET ORAL DAILY
Refills: 0 | Status: ACTIVE | COMMUNITY

## 2024-12-17 RX ORDER — ATORVASTATIN CALCIUM 80 MG/1
80 TABLET, FILM COATED ORAL DAILY
Qty: 90 | Refills: 3 | Status: ACTIVE | COMMUNITY
Start: 1900-01-01 | End: 1900-01-01

## 2024-12-17 RX ORDER — SEMAGLUTIDE 0.68 MG/ML
2 INJECTION, SOLUTION SUBCUTANEOUS
Refills: 0 | Status: ACTIVE | COMMUNITY

## 2024-12-17 RX ORDER — FINASTERIDE 5 MG/1
5 TABLET, FILM COATED ORAL DAILY
Refills: 0 | Status: ACTIVE | COMMUNITY

## 2024-12-17 RX ORDER — GLIMEPIRIDE 4 MG/1
4 TABLET ORAL DAILY
Refills: 0 | Status: ACTIVE | COMMUNITY

## 2024-12-17 RX ORDER — FENOFIBRATE 160 MG/1
160 TABLET ORAL DAILY
Refills: 0 | Status: ACTIVE | COMMUNITY

## 2024-12-17 RX ORDER — ASPIRIN 81 MG/1
81 TABLET, COATED ORAL DAILY
Qty: 90 | Refills: 3 | Status: ACTIVE | COMMUNITY
Start: 1900-01-01 | End: 1900-01-01

## 2024-12-17 RX ORDER — CLOPIDOGREL BISULFATE 75 MG/1
75 TABLET, FILM COATED ORAL DAILY
Qty: 90 | Refills: 0 | Status: ACTIVE | COMMUNITY
Start: 1900-01-01 | End: 1900-01-01

## 2024-12-23 ENCOUNTER — APPOINTMENT (OUTPATIENT)
Dept: OPHTHALMOLOGY | Facility: HOSPITAL | Age: 69
End: 2024-12-23

## 2024-12-24 ENCOUNTER — APPOINTMENT (OUTPATIENT)
Facility: CLINIC | Age: 69
End: 2024-12-24

## 2025-01-17 ENCOUNTER — APPOINTMENT (OUTPATIENT)
Dept: NEUROSURGERY | Facility: CLINIC | Age: 70
End: 2025-01-17

## 2025-01-17 ENCOUNTER — NON-APPOINTMENT (OUTPATIENT)
Age: 70
End: 2025-01-17

## 2025-01-17 VITALS
SYSTOLIC BLOOD PRESSURE: 122 MMHG | HEIGHT: 71 IN | WEIGHT: 305 LBS | DIASTOLIC BLOOD PRESSURE: 79 MMHG | BODY MASS INDEX: 42.7 KG/M2

## 2025-01-17 DIAGNOSIS — Z87.39 PERSONAL HISTORY OF OTHER DISEASES OF THE MUSCULOSKELETAL SYSTEM AND CONNECTIVE TISSUE: ICD-10-CM

## 2025-01-17 DIAGNOSIS — Z78.9 OTHER SPECIFIED HEALTH STATUS: ICD-10-CM

## 2025-01-17 DIAGNOSIS — Z83.3 FAMILY HISTORY OF DIABETES MELLITUS: ICD-10-CM

## 2025-01-17 DIAGNOSIS — Z86.39 PERSONAL HISTORY OF OTHER ENDOCRINE, NUTRITIONAL AND METABOLIC DISEASE: ICD-10-CM

## 2025-01-17 DIAGNOSIS — Z86.69 PERSONAL HISTORY OF OTHER DISEASES OF THE NERVOUS SYSTEM AND SENSE ORGANS: ICD-10-CM

## 2025-01-17 DIAGNOSIS — Z86.79 PERSONAL HISTORY OF OTHER DISEASES OF THE CIRCULATORY SYSTEM: ICD-10-CM

## 2025-01-17 DIAGNOSIS — D35.2 BENIGN NEOPLASM OF PITUITARY GLAND: ICD-10-CM

## 2025-01-17 DIAGNOSIS — I65.29 OCCLUSION AND STENOSIS OF UNSPECIFIED CAROTID ARTERY: ICD-10-CM

## 2025-01-17 PROCEDURE — 99204 OFFICE O/P NEW MOD 45 MIN: CPT

## 2025-01-22 ENCOUNTER — RESULT REVIEW (OUTPATIENT)
Age: 70
End: 2025-01-22

## 2025-01-22 ENCOUNTER — OUTPATIENT (OUTPATIENT)
Dept: OUTPATIENT SERVICES | Facility: HOSPITAL | Age: 70
LOS: 1 days | End: 2025-01-22
Payer: MEDICARE

## 2025-01-22 DIAGNOSIS — I65.29 OCCLUSION AND STENOSIS OF UNSPECIFIED CAROTID ARTERY: ICD-10-CM

## 2025-01-22 DIAGNOSIS — Z90.49 ACQUIRED ABSENCE OF OTHER SPECIFIED PARTS OF DIGESTIVE TRACT: Chronic | ICD-10-CM

## 2025-01-22 PROCEDURE — 93880 EXTRACRANIAL BILAT STUDY: CPT | Mod: 26

## 2025-01-22 PROCEDURE — 93880 EXTRACRANIAL BILAT STUDY: CPT

## 2025-01-23 DIAGNOSIS — I65.29 OCCLUSION AND STENOSIS OF UNSPECIFIED CAROTID ARTERY: ICD-10-CM

## 2025-01-27 ENCOUNTER — APPOINTMENT (OUTPATIENT)
Dept: OPHTHALMOLOGY | Facility: HOSPITAL | Age: 70
End: 2025-01-27

## 2025-01-27 ENCOUNTER — OUTPATIENT (OUTPATIENT)
Dept: OUTPATIENT SERVICES | Facility: HOSPITAL | Age: 70
LOS: 1 days | Discharge: HOME | End: 2025-01-27
Payer: MEDICARE

## 2025-01-27 VITALS
WEIGHT: 304.9 LBS | RESPIRATION RATE: 17 BRPM | OXYGEN SATURATION: 96 % | HEART RATE: 82 BPM | HEIGHT: 71 IN | SYSTOLIC BLOOD PRESSURE: 150 MMHG | TEMPERATURE: 98 F | DIASTOLIC BLOOD PRESSURE: 76 MMHG

## 2025-01-27 VITALS — SYSTOLIC BLOOD PRESSURE: 146 MMHG | DIASTOLIC BLOOD PRESSURE: 82 MMHG | HEART RATE: 80 BPM

## 2025-01-27 DIAGNOSIS — H25.22 AGE-RELATED CATARACT, MORGAGNIAN TYPE, LEFT EYE: ICD-10-CM

## 2025-01-27 DIAGNOSIS — Z90.49 ACQUIRED ABSENCE OF OTHER SPECIFIED PARTS OF DIGESTIVE TRACT: Chronic | ICD-10-CM

## 2025-01-27 LAB — GLUCOSE BLDC GLUCOMTR-MCNC: 264 MG/DL — HIGH (ref 70–99)

## 2025-01-27 PROCEDURE — 66984 XCAPSL CTRC RMVL W/O ECP: CPT | Mod: LT

## 2025-01-27 PROCEDURE — 82962 GLUCOSE BLOOD TEST: CPT

## 2025-01-27 PROCEDURE — V2632: CPT

## 2025-01-27 NOTE — PRE-ANESTHESIA EVALUATION ADULT - NSANTHOSAYNRD_GEN_A_CORE
No. ALECIA screening performed.  STOP BANG Legend: 0-2 = LOW Risk; 3-4 = INTERMEDIATE Risk; 5-8 = HIGH Risk

## 2025-01-27 NOTE — ASU PATIENT PROFILE, ADULT - FALL HARM RISK - UNIVERSAL INTERVENTIONS
Bed in lowest position, wheels locked, appropriate side rails in place/Call bell, personal items and telephone in reach/Instruct patient to call for assistance before getting out of bed or chair/Non-slip footwear when patient is out of bed/[place] to call system/Physically safe environment - no spills, clutter or unnecessary equipment/Purposeful Proactive Rounding/Room/bathroom lighting operational, light cord in reach

## 2025-01-27 NOTE — ASU PATIENT PROFILE, ADULT - VISION (WITH CORRECTIVE LENSES IF THE PATIENT USUALLY WEARS THEM):
Consent: The patient's consent was obtained including but not limited to risks of crusting, scabbing, blistering, scarring, darker or lighter pigmentary change, recurrence, incomplete removal and infection. Medical Necessity Information: It is in your best interest to select a reason for this procedure from the list below. All of these items fulfill various CMS LCD requirements except the new and changing color options. Detail Level: Zone Post-Care Instructions: I reviewed with the patient in detail post-care instructions. Patient is to wear sunprotection, and avoid picking at any of the treated lesions. Pt may apply Vaseline to crusted or scabbing areas. Render Post Care In The Note?: yes Duration Of Freeze Thaw-Cycle (Seconds): 10 Render In Bullet Format When Appropriate: No Total Number Of Lesions Treated: 5 Number Of Freeze-Thaw Cycles: 2 freeze-thaw cycles Medical Necessity Clause: This procedure was medically necessary because the lesions that were treated were: Spray Paint Text: The liquid nitrogen was applied to the skin utilizing a spray paint frosting technique. Partially impaired: cannot see medication labels or newsprint, but can see obstacles in path, and the surrounding layout; can count fingers at arm's length

## 2025-01-27 NOTE — PACU DISCHARGE NOTE - HYDRATION STATUS:
Satisfactory Statement Selected Statement Selected Statement Selected Statement Selected Statement Selected Statement Selected Statement Selected Statement Selected Statement Selected Statement Selected Statement Selected Statement Selected Statement Selected Statement Selected Statement Selected Statement Selected Statement Selected Statement Selected Statement Selected Statement Selected Statement Selected

## 2025-01-28 ENCOUNTER — NON-APPOINTMENT (OUTPATIENT)
Age: 70
End: 2025-01-28

## 2025-01-28 ENCOUNTER — APPOINTMENT (OUTPATIENT)
Facility: CLINIC | Age: 70
End: 2025-01-28
Payer: MEDICARE

## 2025-01-28 PROCEDURE — 99024 POSTOP FOLLOW-UP VISIT: CPT

## 2025-01-29 DIAGNOSIS — H25.12 AGE-RELATED NUCLEAR CATARACT, LEFT EYE: ICD-10-CM

## 2025-01-29 DIAGNOSIS — Z79.85 LONG-TERM (CURRENT) USE OF INJECTABLE NON-INSULIN ANTIDIABETIC DRUGS: ICD-10-CM

## 2025-01-29 DIAGNOSIS — Z79.84 LONG TERM (CURRENT) USE OF ORAL HYPOGLYCEMIC DRUGS: ICD-10-CM

## 2025-01-29 DIAGNOSIS — E78.5 HYPERLIPIDEMIA, UNSPECIFIED: ICD-10-CM

## 2025-01-29 DIAGNOSIS — I10 ESSENTIAL (PRIMARY) HYPERTENSION: ICD-10-CM

## 2025-01-29 DIAGNOSIS — E11.36 TYPE 2 DIABETES MELLITUS WITH DIABETIC CATARACT: ICD-10-CM

## 2025-01-29 DIAGNOSIS — Z79.82 LONG TERM (CURRENT) USE OF ASPIRIN: ICD-10-CM

## 2025-02-07 ENCOUNTER — RX RENEWAL (OUTPATIENT)
Age: 70
End: 2025-02-07

## 2025-02-12 ENCOUNTER — APPOINTMENT (OUTPATIENT)
Dept: NEUROSURGERY | Facility: CLINIC | Age: 70
End: 2025-02-12

## 2025-02-12 VITALS
BODY MASS INDEX: 41.72 KG/M2 | HEIGHT: 71 IN | SYSTOLIC BLOOD PRESSURE: 125 MMHG | DIASTOLIC BLOOD PRESSURE: 79 MMHG | WEIGHT: 298 LBS

## 2025-02-12 DIAGNOSIS — I65.29 OCCLUSION AND STENOSIS OF UNSPECIFIED CAROTID ARTERY: ICD-10-CM

## 2025-02-12 PROCEDURE — 99214 OFFICE O/P EST MOD 30 MIN: CPT

## 2025-02-25 ENCOUNTER — OUTPATIENT (OUTPATIENT)
Dept: OUTPATIENT SERVICES | Facility: HOSPITAL | Age: 70
LOS: 1 days | End: 2025-02-25
Payer: MEDICARE

## 2025-02-25 ENCOUNTER — RESULT REVIEW (OUTPATIENT)
Age: 70
End: 2025-02-25

## 2025-02-25 VITALS
HEART RATE: 60 BPM | WEIGHT: 296.08 LBS | HEIGHT: 71 IN | RESPIRATION RATE: 16 BRPM | SYSTOLIC BLOOD PRESSURE: 130 MMHG | DIASTOLIC BLOOD PRESSURE: 76 MMHG | OXYGEN SATURATION: 99 % | TEMPERATURE: 98 F

## 2025-02-25 DIAGNOSIS — Z90.49 ACQUIRED ABSENCE OF OTHER SPECIFIED PARTS OF DIGESTIVE TRACT: Chronic | ICD-10-CM

## 2025-02-25 DIAGNOSIS — I65.29 OCCLUSION AND STENOSIS OF UNSPECIFIED CAROTID ARTERY: ICD-10-CM

## 2025-02-25 DIAGNOSIS — N28.1 CYST OF KIDNEY, ACQUIRED: Chronic | ICD-10-CM

## 2025-02-25 DIAGNOSIS — Z01.818 ENCOUNTER FOR OTHER PREPROCEDURAL EXAMINATION: ICD-10-CM

## 2025-02-25 LAB
ANION GAP SERPL CALC-SCNC: 17 MMOL/L — HIGH (ref 7–14)
APTT BLD: 29.7 SEC — SIGNIFICANT CHANGE UP (ref 27–39.2)
BASOPHILS # BLD AUTO: 0.05 K/UL — SIGNIFICANT CHANGE UP (ref 0–0.2)
BASOPHILS NFR BLD AUTO: 0.6 % — SIGNIFICANT CHANGE UP (ref 0–1)
BUN SERPL-MCNC: 28 MG/DL — HIGH (ref 10–20)
CALCIUM SERPL-MCNC: 11 MG/DL — HIGH (ref 8.4–10.5)
CHLORIDE SERPL-SCNC: 102 MMOL/L — SIGNIFICANT CHANGE UP (ref 98–110)
CO2 SERPL-SCNC: 22 MMOL/L — SIGNIFICANT CHANGE UP (ref 17–32)
CREAT SERPL-MCNC: 1.1 MG/DL — SIGNIFICANT CHANGE UP (ref 0.7–1.5)
EGFR: 73 ML/MIN/1.73M2 — SIGNIFICANT CHANGE UP
EOSINOPHIL # BLD AUTO: 0.27 K/UL — SIGNIFICANT CHANGE UP (ref 0–0.7)
EOSINOPHIL NFR BLD AUTO: 3 % — SIGNIFICANT CHANGE UP (ref 0–8)
GLUCOSE SERPL-MCNC: 175 MG/DL — HIGH (ref 70–99)
HCT VFR BLD CALC: 40.2 % — LOW (ref 42–52)
HGB BLD-MCNC: 13.9 G/DL — LOW (ref 14–18)
IMM GRANULOCYTES NFR BLD AUTO: 1.2 % — HIGH (ref 0.1–0.3)
INR BLD: 0.86 RATIO — SIGNIFICANT CHANGE UP (ref 0.65–1.3)
LYMPHOCYTES # BLD AUTO: 1.48 K/UL — SIGNIFICANT CHANGE UP (ref 1.2–3.4)
LYMPHOCYTES # BLD AUTO: 16.3 % — LOW (ref 20.5–51.1)
MCHC RBC-ENTMCNC: 31 PG — SIGNIFICANT CHANGE UP (ref 27–31)
MCHC RBC-ENTMCNC: 34.6 G/DL — SIGNIFICANT CHANGE UP (ref 32–37)
MCV RBC AUTO: 89.5 FL — SIGNIFICANT CHANGE UP (ref 80–94)
MONOCYTES # BLD AUTO: 0.81 K/UL — HIGH (ref 0.1–0.6)
MONOCYTES NFR BLD AUTO: 8.9 % — SIGNIFICANT CHANGE UP (ref 1.7–9.3)
NEUTROPHILS # BLD AUTO: 6.34 K/UL — SIGNIFICANT CHANGE UP (ref 1.4–6.5)
NEUTROPHILS NFR BLD AUTO: 70 % — SIGNIFICANT CHANGE UP (ref 42.2–75.2)
NRBC BLD AUTO-RTO: 0 /100 WBCS — SIGNIFICANT CHANGE UP (ref 0–0)
PLATELET # BLD AUTO: 245 K/UL — SIGNIFICANT CHANGE UP (ref 130–400)
PMV BLD: 10.7 FL — HIGH (ref 7.4–10.4)
POTASSIUM SERPL-MCNC: 4.3 MMOL/L — SIGNIFICANT CHANGE UP (ref 3.5–5)
POTASSIUM SERPL-SCNC: 4.3 MMOL/L — SIGNIFICANT CHANGE UP (ref 3.5–5)
PROTHROM AB SERPL-ACNC: 10.1 SEC — SIGNIFICANT CHANGE UP (ref 9.95–12.87)
RBC # BLD: 4.49 M/UL — LOW (ref 4.7–6.1)
RBC # FLD: 13.3 % — SIGNIFICANT CHANGE UP (ref 11.5–14.5)
SODIUM SERPL-SCNC: 141 MMOL/L — SIGNIFICANT CHANGE UP (ref 135–146)
WBC # BLD: 9.06 K/UL — SIGNIFICANT CHANGE UP (ref 4.8–10.8)
WBC # FLD AUTO: 9.06 K/UL — SIGNIFICANT CHANGE UP (ref 4.8–10.8)

## 2025-02-25 PROCEDURE — 93010 ELECTROCARDIOGRAM REPORT: CPT

## 2025-02-25 PROCEDURE — 85025 COMPLETE CBC W/AUTO DIFF WBC: CPT

## 2025-02-25 PROCEDURE — 99214 OFFICE O/P EST MOD 30 MIN: CPT | Mod: 25

## 2025-02-25 PROCEDURE — 36415 COLL VENOUS BLD VENIPUNCTURE: CPT

## 2025-02-25 PROCEDURE — 71046 X-RAY EXAM CHEST 2 VIEWS: CPT | Mod: 26

## 2025-02-25 PROCEDURE — 71046 X-RAY EXAM CHEST 2 VIEWS: CPT

## 2025-02-25 PROCEDURE — 93005 ELECTROCARDIOGRAM TRACING: CPT

## 2025-02-25 PROCEDURE — 85610 PROTHROMBIN TIME: CPT

## 2025-02-25 PROCEDURE — 80048 BASIC METABOLIC PNL TOTAL CA: CPT

## 2025-02-25 PROCEDURE — 85730 THROMBOPLASTIN TIME PARTIAL: CPT

## 2025-02-25 NOTE — H&P PST ADULT - REASON FOR ADMISSION
Case Type: OP Block TimeSuite: Interventional RadiologyProceduralist: Winston Perez  Confirmed Surgery DateTime: 03- - 0:00PAST DateTime: 02- - 8:30Procedure: RIGHT CAROTID STENTING  ERP?: NoLaterality: N/ALength of Procedure: 120 Minutes  Anesthesia Type: General

## 2025-02-25 NOTE — H&P PST ADULT - NSICDXPASTMEDICALHX_GEN_ALL_CORE_FT
PAST MEDICAL HISTORY:  DM (diabetes mellitus)     HLD (hyperlipidemia)     HTN (hypertension)     Obese     ALECIA (obstructive sleep apnea)

## 2025-02-25 NOTE — H&P PST ADULT - HISTORY OF PRESENT ILLNESS
69 yo male  presents for PAST in preparation for  RIGHT CAROTID STENTING.  Pt reports that several months ago he was hospitalized for "not feeling Good", dizziness and weakness 70 % narrowing on the right carotid stenosis.   PATIENT/GUARDIAN CURRENTLY DENIES CHEST PAIN  SHORTNESS OF BREATH  PALPITATIONS,  DYSURIA, OR UPPER RESPIRATORY INFECTION IN PAST 2 WEEKS    Anesthesia Alert  YES--Difficult Airway, class IV   NO--History of neck surgery or radiation  NO--Limited ROM of neck  NO--History of Malignant hyperthermia  NO--No personal or family history of Pseudocholinesterase deficiency.  NO--Prior Anesthesia Complication  NO--Latex Allergy  NO--Loose teeth  NO--History of Rheumatoid Arthritis  YES--Bleeding risk  YES--ALECIA, NO TX  NO--Other  [facility] Activity Status Index (DASI) from [employer]  on 2/25/2025  ** All calculations should be rechecked by clinician prior to use **    RESULT SUMMARY:  58.2 points  The higher the score (maximum 58.2), the higher the functional status.    9.89 METs        INPUTS:  Take care of self —> 2.75 = Yes  Walk indoors —> 1.75 = Yes  Walk 1&ndash;2 blocks on level ground —> 2.75 = Yes  Climb a flight of stairs or walk up a hill —> 5.5 = Yes  Run a short distance —> 8 = Yes  Do light work around the house —> 2.7 = Yes  Do moderate work around the house —> 3.5 = Yes  Do heavy work around the house —> 8 = Yes  Do yardwork —> 4.5 = Yes  Have sexual relations —> 5.25 = Yes  Participate in moderate recreational activities —> 6 = Yes  Participate in strenuous sports —> 7.5 = Yes  Revised Cardiac Risk Index for Pre-Operative Risk from [employer]  on 2/25/2025  ** All calculations should be rechecked by clinician prior to use **    RESULT SUMMARY:  0 points  RCRI Score    3.9 %  Risk of major cardiac event      INPUTS:  High-risk surgery —> 0 = No  History of ischemic heart disease —> 0 = No  History of congestive heart failure —> 0 = No  History of cerebrovascular disease —> 0 = No  Pre-operative treatment with insulin —> 0 = No  Pre-operative creatinine >2 mg/dL / 176.8 µmol/L —> 0 = No    PT/GUARDIAN DENIES ANY RASHES, ABRASION, OR OPEN WOUNDS OR CUTS    AS PER THE PT/GUARDIAN, THIS IS HIS/HER COMPLETE MEDICAL AND SURGICAL HX, INCLUDING MEDICATIONS PRESCRIBED AND OVER THE COUNTER  Patient/guardian understands the instructions and was given the opportunity to ask questions and have them answered.  pt/guardian denies any suicidal ideation or thoughts, pt states not a threat to self or others

## 2025-02-26 DIAGNOSIS — Z01.818 ENCOUNTER FOR OTHER PREPROCEDURAL EXAMINATION: ICD-10-CM

## 2025-03-04 PROBLEM — E66.9 OBESITY, UNSPECIFIED: Chronic | Status: ACTIVE | Noted: 2025-02-25

## 2025-03-04 PROBLEM — G47.33 OBSTRUCTIVE SLEEP APNEA (ADULT) (PEDIATRIC): Chronic | Status: ACTIVE | Noted: 2025-02-25

## 2025-03-18 ENCOUNTER — RESULT REVIEW (OUTPATIENT)
Age: 70
End: 2025-03-18

## 2025-03-18 ENCOUNTER — INPATIENT (INPATIENT)
Facility: HOSPITAL | Age: 70
LOS: 0 days | Discharge: LEFT BEFORE DISCHARGE | DRG: 35 | End: 2025-03-19
Attending: NEUROLOGICAL SURGERY | Admitting: NEUROLOGICAL SURGERY
Payer: MEDICARE

## 2025-03-18 ENCOUNTER — APPOINTMENT (OUTPATIENT)
Dept: NEUROSURGERY | Facility: HOSPITAL | Age: 70
End: 2025-03-18

## 2025-03-18 VITALS
DIASTOLIC BLOOD PRESSURE: 77 MMHG | RESPIRATION RATE: 18 BRPM | OXYGEN SATURATION: 100 % | TEMPERATURE: 97 F | SYSTOLIC BLOOD PRESSURE: 170 MMHG | HEART RATE: 89 BPM

## 2025-03-18 DIAGNOSIS — N28.1 CYST OF KIDNEY, ACQUIRED: Chronic | ICD-10-CM

## 2025-03-18 DIAGNOSIS — I10 ESSENTIAL (PRIMARY) HYPERTENSION: ICD-10-CM

## 2025-03-18 DIAGNOSIS — I65.21 OCCLUSION AND STENOSIS OF RIGHT CAROTID ARTERY: ICD-10-CM

## 2025-03-18 DIAGNOSIS — G47.33 OBSTRUCTIVE SLEEP APNEA (ADULT) (PEDIATRIC): ICD-10-CM

## 2025-03-18 DIAGNOSIS — Z79.899 OTHER LONG TERM (CURRENT) DRUG THERAPY: ICD-10-CM

## 2025-03-18 DIAGNOSIS — Z53.29 PROCEDURE AND TREATMENT NOT CARRIED OUT BECAUSE OF PATIENT'S DECISION FOR OTHER REASONS: ICD-10-CM

## 2025-03-18 DIAGNOSIS — I65.29 OCCLUSION AND STENOSIS OF UNSPECIFIED CAROTID ARTERY: ICD-10-CM

## 2025-03-18 DIAGNOSIS — E11.9 TYPE 2 DIABETES MELLITUS WITHOUT COMPLICATIONS: ICD-10-CM

## 2025-03-18 DIAGNOSIS — E78.5 HYPERLIPIDEMIA, UNSPECIFIED: ICD-10-CM

## 2025-03-18 DIAGNOSIS — D35.2 BENIGN NEOPLASM OF PITUITARY GLAND: ICD-10-CM

## 2025-03-18 DIAGNOSIS — Z90.49 ACQUIRED ABSENCE OF OTHER SPECIFIED PARTS OF DIGESTIVE TRACT: Chronic | ICD-10-CM

## 2025-03-18 DIAGNOSIS — Z79.84 LONG TERM (CURRENT) USE OF ORAL HYPOGLYCEMIC DRUGS: ICD-10-CM

## 2025-03-18 DIAGNOSIS — E66.3 OVERWEIGHT: ICD-10-CM

## 2025-03-18 DIAGNOSIS — Z79.85 LONG-TERM (CURRENT) USE OF INJECTABLE NON-INSULIN ANTIDIABETIC DRUGS: ICD-10-CM

## 2025-03-18 LAB
ALBUMIN SERPL ELPH-MCNC: 3.9 G/DL — SIGNIFICANT CHANGE UP (ref 3.5–5.2)
ALP SERPL-CCNC: 38 U/L — SIGNIFICANT CHANGE UP (ref 30–115)
ALT FLD-CCNC: 12 U/L — SIGNIFICANT CHANGE UP (ref 0–41)
ANION GAP SERPL CALC-SCNC: 14 MMOL/L — SIGNIFICANT CHANGE UP (ref 7–14)
APTT BLD: 27.7 SEC — SIGNIFICANT CHANGE UP (ref 27–39.2)
AST SERPL-CCNC: 15 U/L — SIGNIFICANT CHANGE UP (ref 0–41)
BASOPHILS # BLD AUTO: 0.03 K/UL — SIGNIFICANT CHANGE UP (ref 0–0.2)
BASOPHILS # BLD AUTO: 0.05 K/UL — SIGNIFICANT CHANGE UP (ref 0–0.2)
BASOPHILS NFR BLD AUTO: 0.3 % — SIGNIFICANT CHANGE UP (ref 0–1)
BASOPHILS NFR BLD AUTO: 0.5 % — SIGNIFICANT CHANGE UP (ref 0–1)
BILIRUB SERPL-MCNC: 0.5 MG/DL — SIGNIFICANT CHANGE UP (ref 0.2–1.2)
BLD GP AB SCN SERPL QL: SIGNIFICANT CHANGE UP
BUN SERPL-MCNC: 20 MG/DL — SIGNIFICANT CHANGE UP (ref 10–20)
CALCIUM SERPL-MCNC: 9.2 MG/DL — SIGNIFICANT CHANGE UP (ref 8.4–10.5)
CO2 SERPL-SCNC: 23 MMOL/L — SIGNIFICANT CHANGE UP (ref 17–32)
CREAT SERPL-MCNC: 0.9 MG/DL — SIGNIFICANT CHANGE UP (ref 0.7–1.5)
EGFR: 92 ML/MIN/1.73M2 — SIGNIFICANT CHANGE UP
EGFR: 92 ML/MIN/1.73M2 — SIGNIFICANT CHANGE UP
EOSINOPHIL # BLD AUTO: 0.18 K/UL — SIGNIFICANT CHANGE UP (ref 0–0.7)
EOSINOPHIL # BLD AUTO: 0.23 K/UL — SIGNIFICANT CHANGE UP (ref 0–0.7)
EOSINOPHIL NFR BLD AUTO: 1.6 % — SIGNIFICANT CHANGE UP (ref 0–8)
EOSINOPHIL NFR BLD AUTO: 2.3 % — SIGNIFICANT CHANGE UP (ref 0–8)
GLUCOSE BLDC GLUCOMTR-MCNC: 146 MG/DL — HIGH (ref 70–99)
GLUCOSE BLDC GLUCOMTR-MCNC: 216 MG/DL — HIGH (ref 70–99)
GLUCOSE SERPL-MCNC: 154 MG/DL — HIGH (ref 70–99)
HCT VFR BLD CALC: 36.2 % — LOW (ref 42–52)
HCT VFR BLD CALC: 39.5 % — LOW (ref 42–52)
HGB BLD-MCNC: 12.4 G/DL — LOW (ref 14–18)
HGB BLD-MCNC: 13.2 G/DL — LOW (ref 14–18)
IMM GRANULOCYTES NFR BLD AUTO: 0.6 % — HIGH (ref 0.1–0.3)
IMM GRANULOCYTES NFR BLD AUTO: 0.8 % — HIGH (ref 0.1–0.3)
INR BLD: 0.93 RATIO — SIGNIFICANT CHANGE UP (ref 0.65–1.3)
INR BLD: 1.36 RATIO — HIGH (ref 0.65–1.3)
LYMPHOCYTES # BLD AUTO: 0.94 K/UL — LOW (ref 1.2–3.4)
LYMPHOCYTES # BLD AUTO: 1.29 K/UL — SIGNIFICANT CHANGE UP (ref 1.2–3.4)
LYMPHOCYTES # BLD AUTO: 12.6 % — LOW (ref 20.5–51.1)
LYMPHOCYTES # BLD AUTO: 8.5 % — LOW (ref 20.5–51.1)
MAGNESIUM SERPL-MCNC: 1.6 MG/DL — LOW (ref 1.8–2.4)
MCHC RBC-ENTMCNC: 30.1 PG — SIGNIFICANT CHANGE UP (ref 27–31)
MCHC RBC-ENTMCNC: 33.4 G/DL — SIGNIFICANT CHANGE UP (ref 32–37)
MCHC RBC-ENTMCNC: 34.3 G/DL — SIGNIFICANT CHANGE UP (ref 32–37)
MCV RBC AUTO: 89.4 FL — SIGNIFICANT CHANGE UP (ref 80–94)
MCV RBC AUTO: 90 FL — SIGNIFICANT CHANGE UP (ref 80–94)
MONOCYTES # BLD AUTO: 0.77 K/UL — HIGH (ref 0.1–0.6)
MONOCYTES # BLD AUTO: 0.94 K/UL — HIGH (ref 0.1–0.6)
MONOCYTES NFR BLD AUTO: 6.9 % — SIGNIFICANT CHANGE UP (ref 1.7–9.3)
MONOCYTES NFR BLD AUTO: 9.2 % — SIGNIFICANT CHANGE UP (ref 1.7–9.3)
NEUTROPHILS # BLD AUTO: 7.65 K/UL — HIGH (ref 1.4–6.5)
NEUTROPHILS # BLD AUTO: 9.1 K/UL — HIGH (ref 1.4–6.5)
NEUTROPHILS NFR BLD AUTO: 74.8 % — SIGNIFICANT CHANGE UP (ref 42.2–75.2)
NEUTROPHILS NFR BLD AUTO: 81.9 % — HIGH (ref 42.2–75.2)
NRBC BLD AUTO-RTO: 0 /100 WBCS — SIGNIFICANT CHANGE UP (ref 0–0)
NRBC BLD AUTO-RTO: 0 /100 WBCS — SIGNIFICANT CHANGE UP (ref 0–0)
PHOSPHATE SERPL-MCNC: 3.3 MG/DL — SIGNIFICANT CHANGE UP (ref 2.1–4.9)
PLATELET # BLD AUTO: 229 K/UL — SIGNIFICANT CHANGE UP (ref 130–400)
PLATELET # BLD AUTO: 232 K/UL — SIGNIFICANT CHANGE UP (ref 130–400)
PMV BLD: 10.8 FL — HIGH (ref 7.4–10.4)
POTASSIUM SERPL-MCNC: 3.7 MMOL/L — SIGNIFICANT CHANGE UP (ref 3.5–5)
POTASSIUM SERPL-SCNC: 3.7 MMOL/L — SIGNIFICANT CHANGE UP (ref 3.5–5)
PROT SERPL-MCNC: 6.1 G/DL — SIGNIFICANT CHANGE UP (ref 6–8)
PROTHROM AB SERPL-ACNC: 11 SEC — SIGNIFICANT CHANGE UP (ref 9.95–12.87)
PROTHROM AB SERPL-ACNC: 16.1 SEC — HIGH (ref 9.95–12.87)
RBC # BLD: 4.39 M/UL — LOW (ref 4.7–6.1)
RBC # FLD: 13.3 % — SIGNIFICANT CHANGE UP (ref 11.5–14.5)
RBC # FLD: 13.3 % — SIGNIFICANT CHANGE UP (ref 11.5–14.5)
SODIUM SERPL-SCNC: 140 MMOL/L — SIGNIFICANT CHANGE UP (ref 135–146)
WBC # BLD: 10.22 K/UL — SIGNIFICANT CHANGE UP (ref 4.8–10.8)
WBC # BLD: 11.11 K/UL — HIGH (ref 4.8–10.8)
WBC # FLD AUTO: 10.22 K/UL — SIGNIFICANT CHANGE UP (ref 4.8–10.8)
WBC # FLD AUTO: 11.11 K/UL — HIGH (ref 4.8–10.8)

## 2025-03-18 PROCEDURE — 83036 HEMOGLOBIN GLYCOSYLATED A1C: CPT

## 2025-03-18 PROCEDURE — 84100 ASSAY OF PHOSPHORUS: CPT

## 2025-03-18 PROCEDURE — 84443 ASSAY THYROID STIM HORMONE: CPT

## 2025-03-18 PROCEDURE — 85610 PROTHROMBIN TIME: CPT

## 2025-03-18 PROCEDURE — 85025 COMPLETE CBC W/AUTO DIFF WBC: CPT

## 2025-03-18 PROCEDURE — 80061 LIPID PANEL: CPT

## 2025-03-18 PROCEDURE — 82962 GLUCOSE BLOOD TEST: CPT

## 2025-03-18 PROCEDURE — 37215 TRANSCATH STENT CCA W/EPS: CPT | Mod: RT

## 2025-03-18 PROCEDURE — 85730 THROMBOPLASTIN TIME PARTIAL: CPT

## 2025-03-18 PROCEDURE — 83735 ASSAY OF MAGNESIUM: CPT

## 2025-03-18 PROCEDURE — 97166 OT EVAL MOD COMPLEX 45 MIN: CPT | Mod: GO

## 2025-03-18 PROCEDURE — 80053 COMPREHEN METABOLIC PANEL: CPT

## 2025-03-18 PROCEDURE — 82330 ASSAY OF CALCIUM: CPT

## 2025-03-18 PROCEDURE — 97162 PT EVAL MOD COMPLEX 30 MIN: CPT | Mod: GP

## 2025-03-18 PROCEDURE — ZZZZZ: CPT

## 2025-03-18 PROCEDURE — 76937 US GUIDE VASCULAR ACCESS: CPT

## 2025-03-18 PROCEDURE — 36415 COLL VENOUS BLD VENIPUNCTURE: CPT

## 2025-03-18 RX ORDER — FENOFIBRATE 160 MG/1
160 TABLET ORAL DAILY
Refills: 0 | Status: DISCONTINUED | OUTPATIENT
Start: 2025-03-19 | End: 2025-03-19

## 2025-03-18 RX ORDER — POLYETHYLENE GLYCOL 3350 17 G/17G
17 POWDER, FOR SOLUTION ORAL DAILY
Refills: 0 | Status: DISCONTINUED | OUTPATIENT
Start: 2025-03-18 | End: 2025-03-19

## 2025-03-18 RX ORDER — ATORVASTATIN CALCIUM 80 MG/1
80 TABLET, FILM COATED ORAL AT BEDTIME
Refills: 0 | Status: DISCONTINUED | OUTPATIENT
Start: 2025-03-18 | End: 2025-03-19

## 2025-03-18 RX ORDER — TAMSULOSIN HYDROCHLORIDE 0.4 MG/1
0.4 CAPSULE ORAL AT BEDTIME
Refills: 0 | Status: DISCONTINUED | OUTPATIENT
Start: 2025-03-19 | End: 2025-03-19

## 2025-03-18 RX ORDER — TICAGRELOR 90 MG/1
180 TABLET ORAL ONCE
Refills: 0 | Status: COMPLETED | OUTPATIENT
Start: 2025-03-18 | End: 2025-03-18

## 2025-03-18 RX ORDER — SODIUM CHLORIDE 9 G/1000ML
1000 INJECTION, SOLUTION INTRAVENOUS
Refills: 0 | Status: DISCONTINUED | OUTPATIENT
Start: 2025-03-18 | End: 2025-03-19

## 2025-03-18 RX ORDER — DEXTROSE 50 % IN WATER 50 %
25 SYRINGE (ML) INTRAVENOUS ONCE
Refills: 0 | Status: DISCONTINUED | OUTPATIENT
Start: 2025-03-18 | End: 2025-03-19

## 2025-03-18 RX ORDER — ASPIRIN 325 MG
81 TABLET ORAL DAILY
Refills: 0 | Status: DISCONTINUED | OUTPATIENT
Start: 2025-03-19 | End: 2025-03-19

## 2025-03-18 RX ORDER — LOSARTAN POTASSIUM 100 MG/1
25 TABLET, FILM COATED ORAL DAILY
Refills: 0 | Status: DISCONTINUED | OUTPATIENT
Start: 2025-03-18 | End: 2025-03-19

## 2025-03-18 RX ORDER — INSULIN LISPRO 100 U/ML
INJECTION, SOLUTION INTRAVENOUS; SUBCUTANEOUS
Refills: 0 | Status: DISCONTINUED | OUTPATIENT
Start: 2025-03-18 | End: 2025-03-19

## 2025-03-18 RX ORDER — NICARDIPINE HCL 30 MG
5 CAPSULE ORAL
Qty: 40 | Refills: 0 | Status: DISCONTINUED | OUTPATIENT
Start: 2025-03-18 | End: 2025-03-19

## 2025-03-18 RX ORDER — SENNA 187 MG
2 TABLET ORAL AT BEDTIME
Refills: 0 | Status: DISCONTINUED | OUTPATIENT
Start: 2025-03-18 | End: 2025-03-19

## 2025-03-18 RX ORDER — TICAGRELOR 90 MG/1
90 TABLET ORAL
Refills: 0 | Status: DISCONTINUED | OUTPATIENT
Start: 2025-03-18 | End: 2025-03-19

## 2025-03-18 RX ORDER — FINASTERIDE 1 MG/1
5 TABLET, FILM COATED ORAL DAILY
Refills: 0 | Status: DISCONTINUED | OUTPATIENT
Start: 2025-03-18 | End: 2025-03-19

## 2025-03-18 RX ORDER — HYDROMORPHONE/SOD CHLOR,ISO/PF 2 MG/10 ML
0.5 SYRINGE (ML) INJECTION
Refills: 0 | Status: DISCONTINUED | OUTPATIENT
Start: 2025-03-19 | End: 2025-03-19

## 2025-03-18 RX ORDER — DEXTROSE 50 % IN WATER 50 %
15 SYRINGE (ML) INTRAVENOUS ONCE
Refills: 0 | Status: DISCONTINUED | OUTPATIENT
Start: 2025-03-18 | End: 2025-03-19

## 2025-03-18 RX ORDER — B1/B2/B3/B5/B6/B12/VIT C/FOLIC 500-0.5 MG
1 TABLET ORAL DAILY
Refills: 0 | Status: DISCONTINUED | OUTPATIENT
Start: 2025-03-18 | End: 2025-03-19

## 2025-03-18 RX ORDER — ASPIRIN 325 MG
81 TABLET ORAL DAILY
Refills: 0 | Status: DISCONTINUED | OUTPATIENT
Start: 2025-03-18 | End: 2025-03-19

## 2025-03-18 RX ORDER — LABETALOL HYDROCHLORIDE 200 MG/1
10 TABLET, FILM COATED ORAL EVERY 4 HOURS
Refills: 0 | Status: DISCONTINUED | OUTPATIENT
Start: 2025-03-18 | End: 2025-03-19

## 2025-03-18 RX ORDER — LOSARTAN POTASSIUM 100 MG/1
1 TABLET, FILM COATED ORAL
Refills: 0 | DISCHARGE

## 2025-03-18 RX ORDER — ONDANSETRON HCL/PF 4 MG/2 ML
4 VIAL (ML) INJECTION ONCE
Refills: 0 | Status: DISCONTINUED | OUTPATIENT
Start: 2025-03-19 | End: 2025-03-19

## 2025-03-18 RX ORDER — GLUCAGON 3 MG/1
1 POWDER NASAL ONCE
Refills: 0 | Status: DISCONTINUED | OUTPATIENT
Start: 2025-03-18 | End: 2025-03-19

## 2025-03-18 RX ORDER — DEXTROSE 50 % IN WATER 50 %
12.5 SYRINGE (ML) INTRAVENOUS ONCE
Refills: 0 | Status: DISCONTINUED | OUTPATIENT
Start: 2025-03-18 | End: 2025-03-19

## 2025-03-18 RX ORDER — HYDROMORPHONE/SOD CHLOR,ISO/PF 2 MG/10 ML
1 SYRINGE (ML) INJECTION
Refills: 0 | Status: DISCONTINUED | OUTPATIENT
Start: 2025-03-19 | End: 2025-03-19

## 2025-03-18 RX ADMIN — TICAGRELOR 180 MILLIGRAM(S): 90 TABLET ORAL at 11:57

## 2025-03-18 RX ADMIN — Medication 81 MILLIGRAM(S): at 11:57

## 2025-03-18 RX ADMIN — Medication 2 TABLET(S): at 20:20

## 2025-03-18 RX ADMIN — TICAGRELOR 90 MILLIGRAM(S): 90 TABLET ORAL at 20:21

## 2025-03-18 RX ADMIN — ATORVASTATIN CALCIUM 80 MILLIGRAM(S): 80 TABLET, FILM COATED ORAL at 20:20

## 2025-03-18 NOTE — BRIEF OPERATIVE NOTE - OPERATION/FINDINGS
Procedure: Diagnostic Cerebral Angiogram + Right Carotid Angioplasty and Stenting  Contrast: Visipaque 320   IA medications: Heparin 3000 IU, Verapamil 2.5mg, Nitroglycerin 200 mcg  Implants placed: None  Complications: None    Preliminary Report:  A selective diagnostic cerebral angiogram + right Carotid artery angioplasty and stenting x 1 stent was performed. A right radial arteriotomy site was used and a TR band was applied post procedure for deflation by the IR RN.     Please continue to monitor the arteriotomy site for signs of hematoma/ bleeding/ infection or for diminished or absent distal pulses or temperature/ color changes. Notify a provider if any of the above is noticed or with any additional questions/ concerns.   NIHSS pre procedure: 0  NIHSS post procedure: 0  Extremity: RUE remains c/d/i, nontender, no color or temperature changes, no bleeding, +radial pulse    Official IR neuro procedure note is to follow.

## 2025-03-18 NOTE — CONSULT NOTE ADULT - SUBJECTIVE AND OBJECTIVE BOX
SUMMARY: HPI:  70-year-old make with PMHx of DM, HLD, HTN, and a pituitary adenoma initially presented in January with R sided weakness found on ultrasound to have 70% right carotid stenosis with a bulky plaque visualized within the carotid bulb. Now s/p elective DSA + R carotid angioplasty and stenting 3/18/25. NIHSS 0 pre and post NSICU consulted for postoperative management.         ADMISSION SCORES:    NIHSS: 0    SEDATION SCORES:  RASS: CAM-ICU:     REVIEW OF SYSTEMS:     VITALS: [X] Reviewed    IMAGING/DATA: [X] Reviewed    IVF FLUIDS/MEDICATIONS: [X] Reviewed    ALLERGIES: Allergies    No Known Allergies    Intolerances        DEVICES:   [] Restraints [] PIVs [] ET tube [] central line [] PICC [] arterial line [] pang [] NGT/OGT [] EVD [] LD [] ERLIN/HMV [] LiCOX [] ICP monitor [] Trach [] PEG [] Chest Tube [] other:    EXAMINATION:  General: No acute distress  HEENT: Anicteric sclerae  Cardiac: [id number]  Lungs: Clear  Abdomen: Soft, non-tender, +BS  Extremities: No c/c/e  Skin/Incision Site: Clean, dry and intact  Neurologic: Awake, alert, fully oriented, follows commands, PERRL, VFFtc, EOMI, face symmetric, tongue midline, no drift, full strength  Awake, alert, fully oriented x3, no dysarthria or aphasia, follows commands, PERRL, VFFtc, EOMI, no gaze preference or dysconjugate gaze, face symmetric, tongue midline, able to lift B/L UE and LE AG, no drift, full strength 5/5, SILT, no neglect, no dysmetria B/L UE FTN      ICU Vital Signs Last 24 Hrs  T(C): 36.7 (18 Mar 2025 18:15), Max: 36.7 (18 Mar 2025 18:15)  T(F): 98.1 (18 Mar 2025 18:15), Max: 98.1 (18 Mar 2025 18:15)  HR: 70 (18 Mar 2025 18:15) (70 - 89)  BP: 170/77 (18 Mar 2025 16:07) (170/77 - 170/77)  BP(mean): --  ABP: 131/51 (18 Mar 2025 18:15) (131/51 - 131/51)  ABP(mean): --  RR: 18 (18 Mar 2025 18:15) (18 - 18)  SpO2: 97% (18 Mar 2025 18:15) (97% - 100%)            aspirin enteric coated 81 milliGRAM(s) Oral daily  atorvastatin 80 milliGRAM(s) Oral at bedtime  dextrose 5%. 1000 milliLiter(s) (50 mL/Hr) IV Continuous <Continuous>  dextrose 5%. 1000 milliLiter(s) (100 mL/Hr) IV Continuous <Continuous>  dextrose 50% Injectable 25 Gram(s) IV Push once  dextrose 50% Injectable 12.5 Gram(s) IV Push once  dextrose 50% Injectable 25 Gram(s) IV Push once  dextrose Oral Gel 15 Gram(s) Oral once PRN  finasteride 5 milliGRAM(s) Oral daily  glucagon  Injectable 1 milliGRAM(s) IntraMuscular once  insulin lispro (ADMELOG) corrective regimen sliding scale   SubCutaneous three times a day before meals  multivitamin 1 Tablet(s) Oral daily  polyethylene glycol 3350 17 Gram(s) Oral daily  senna 2 Tablet(s) Oral at bedtime  ticagrelor 90 milliGRAM(s) Oral two times a day      LABS:  Na:   K:   Cl:   CO2:   BUN:   Cr:   Glu:     Hgb: 13.2 (03-18 @ 12:00)  Hct: 39.5 (03-18 @ 12:00)  WBC: 11.11 (03-18 @ 12:00)  Plt: 229 (03-18 @ 12:00)    INR: 0.93 03-18-25 @ 12:00  PTT: 29.7 03-18-25 @ 12:00                 SUMMARY: HPI:  The patient is a 70-year-old male with a past medical history of DM, HTN, HLD, and a pituitary adenoma who initially presented 1/17/2025 after having experienced transient right-sided weakness. 1/22/2025 ultrasound of the carotid arteries showed >70% right carotid stenosis with a bulky plaque visualized within the carotid bulb. The patient had not taken Plavix x 5 days and so was loaded with 180mg Brilinta PO pre procedure and Aspirin 81mg PO. The patient had no acute complaints on exam. The patient presents for a planned diagnostic cerebral angiogram + right carotid angioplasty and stenting. NIHSS 0 pre and post-op.     The patient is now being admitted to NSICU s/p (POD# 0)  Diagnostic Cerebral Angiogram + Right Carotid Angioplasty and Stenting x1 for further management. Access site right radial artery CDI, w/o hematoma  and good perfusion.       ADMISSION SCORES:    NIHSS: 0    REVIEW OF SYSTEMS: All pertinent noted in HPI    VITALS: [X] Reviewed    IMAGING/DATA: [X] Reviewed    IVF FLUIDS/MEDICATIONS: [X] Reviewed    ALLERGIES: Allergies    No Known Allergies    Intolerances    VITALS:  T(C): , Max: 36.9 (03-18-25 @ 19:15)  HR:  (60 - 89)  BP:  (170/77 - 170/77)  ABP:  (125/61 - 156/61)  RR:  (18 - 21)  SpO2:  (96% - 100%)  Wt(kg): --      LABS:  Na: 140 (03-18 @ 22:55)  K: 3.7 (03-18 @ 22:55)  Cl: 103 (03-18 @ 22:55)  CO2: 23 (03-18 @ 22:55)  BUN: 20 (03-18 @ 22:55)  Cr: 0.9 (03-18 @ 22:55)  Glu: 154(03-18 @ 22:55)    Hgb: 12.4 (03-18 @ 22:55), 13.2 (03-18 @ 12:00)  Hct: 36.2 (03-18 @ 22:55), 39.5 (03-18 @ 12:00)  WBC: 10.22 (03-18 @ 22:55), 11.11 (03-18 @ 12:00)  Plt: 232 (03-18 @ 22:55), 229 (03-18 @ 12:00)  PT: 16.10 (03-18 @ 22:55)  INR: 1.36 (03-18 @ 22:55)  aPTT: 27.7 (03-18 @ 22:55), PT: 11.00 (03-18 @ 12:00)  INR: 0.93 (03-18 @ 12:00)  aPTT: 29.7 (03-18 @ 12:00)    IMAGING:   Recent imaging studies were reviewed.    MEDICATIONS:  aspirin  chewable 81 milliGRAM(s) Oral daily  aspirin enteric coated 81 milliGRAM(s) Oral daily  atorvastatin 80 milliGRAM(s) Oral at bedtime  dextrose 5%. 1000 milliLiter(s) IV Continuous <Continuous>  dextrose 5%. 1000 milliLiter(s) IV Continuous <Continuous>  dextrose 50% Injectable 25 Gram(s) IV Push once  dextrose 50% Injectable 12.5 Gram(s) IV Push once  dextrose 50% Injectable 25 Gram(s) IV Push once  dextrose Oral Gel 15 Gram(s) Oral once PRN  fenofibrate Tablet 145 milliGRAM(s) Oral daily  finasteride 5 milliGRAM(s) Oral daily  glucagon  Injectable 1 milliGRAM(s) IntraMuscular once  hydrALAZINE Injectable 5 milliGRAM(s) IV Push once  hydrALAZINE Injectable 5 milliGRAM(s) IV Push once  HYDROmorphone  Injectable 0.5 milliGRAM(s) IV Push every 10 minutes PRN  HYDROmorphone  Injectable 1 milliGRAM(s) IV Push every 10 minutes PRN  insulin lispro (ADMELOG) corrective regimen sliding scale   SubCutaneous three times a day before meals  labetalol Injectable 10 milliGRAM(s) IV Push every 4 hours PRN  losartan 25 milliGRAM(s) Oral daily  meperidine     Injectable 12.5 milliGRAM(s) IV Push every 10 minutes PRN  multivitamin 1 Tablet(s) Oral daily  niCARdipine Infusion 5 mG/Hr IV Continuous <Continuous>  ondansetron Injectable 4 milliGRAM(s) IV Push once PRN  polyethylene glycol 3350 17 Gram(s) Oral daily  potassium chloride  20 mEq/100 mL IVPB 20 milliEquivalent(s) IV Intermittent once  senna 2 Tablet(s) Oral at bedtime  sodium chloride 0.9%. 1000 milliLiter(s) IV Continuous <Continuous>  tamsulosin 0.4 milliGRAM(s) Oral at bedtime  ticagrelor 90 milliGRAM(s) Oral two times a day    DEVICES:   [] Restraints [x] PIVs [] ET tube [] central line [] PICC [x] arterial line [] Baez [] NGT/OGT     EXAMINATION:  General: No acute distress  HEENT: Anicteric sclerae  Cardiac: [id number]  Lungs: Clear  Abdomen: Soft, non-tender, +BS  Extremities: No c/c/e  Skin/Incision Site: Clean, dry and intact  Neurologic: Awake, alert, fully oriented, follows commands, PERRL, VFFtc, EOMI, face symmetric, tongue midline, no drift, full strength, no dysarthria or aphasia,no gaze preference or dysconjugate gaze, TRAN AG, no drift, full strength 5/5, no neglect, no dysmetria.

## 2025-03-18 NOTE — CONSULT NOTE ADULT - ASSESSMENT
ASSESSMENT:  70-year-old male s/p elective DSA + R carotid angioplasty and stenting 3/18/25      PLAN:   NEURO:  - Neuro checks per protocol  - c/w DAPT  - Lipitor 80  - stroke labs  - Pain management: Tylenol  Activity: [X] Increase as tolerated [] Bedrest [X] PT [X] OT [] PMNR    PULM:  - Incentive spirometry 10x/hr while awake  - HOB > 45 degrees  - Keep SaO2 > 95%    CV:  - Keep -140  - Home BP meds: losartan 25  - Telemetry monitoring  - Lipid profile  - Daily statin    RENAL:  - Strict I/Os, daily weights  - Keep euvolemic  - Keep normonatremic   - Keep Magnesium level > 2; Potassium > 4  - Monitor lytes, replete as needed    GI:  Diet: Dysphagia screen and then advance diet as tolerated  GI prophylaxis [x] not indicated [] PPI [] other:  Bowel regimen [X] Miralax [X] senna [] other:    ENDO:   - Goal Serum glucose 120-180  - HgA1c, TSH    HEME/ONC:  VTE prophylaxis: [X] SCDs [] chemoprophylaxis [] hold chemoprophylaxis due to: [] high risk of DVT/PE on admission due to:  - VA Duplex B/L LE    ID:  - Keep normothermic, avoid fevers    MISC:  PT/OT/SLP    CODE STATUS:  [X] Full Code [] DNR [] DNI [] Palliative/Comfort Care    DISPOSITION: [X] NSICU                 ASSESSMENT:  70-year-old male with PMH DM, HLD, HTN and pituitary adenoma, FTH severe R carotid stenosis now  s/p elective DSA + R carotid angioplasty and stenting 3/18/25      PLAN:   NEURO: POD#0 DSA + R carotid angioplasty and stentingx1  - Neuro checks per protocol (Then Q1h)  - c/w DAPT Brilinta and ASA)  - Lipitor 80mg PO daily   - stroke labs-P  - Neuro IR following   - Pain management: Tylenol PRN  Activity: [X] Increase as tolerated [] Bedrest [X] PT [X] OT [X] PMNR    PULM:  - Incentive spirometry 10x/hr while awake  - HOB > 45 degrees  - Keep SaO2 > 95%    CV: Hx HTN, HLD  - Keep -140  - Home BP meds: losartan 25mg Daily   - PRN Labetalol   - Telemetry monitoring  - Lipid profile-P  - Daily statin and tricor    RENAL:  - Strict I/Os, daily weights  - Keep euvolemic  - Keep normonatremic   - Keep Magnesium level > 2; Potassium > 4  - Monitor lytes, replete as needed  - IVL once tolerating adequate PO  - Continue Flomax and  finasteride     GI:  Diet: Dysphagia screen and then advance diet as tolerated  GI prophylaxis [x] not indicated [] PPI [] other:  Bowel regimen [X] Miralax [X] senna [] other:    ENDO: Hx DM  - Goal Serum glucose 120-180  - [id number], TSH-P  - SSI Ac/HS    HEME/ONC:  VTE prophylaxis: [X] SCDs [] chemoprophylaxis when per neuro IR [] hold chemoprophylaxis due to: [] high risk of DVT/PE on admission due to:  - VA Duplex B/L LE if warranted     ID:  - Keep normothermic, avoid fevers    MISC:  PT/OT/SLP    CODE STATUS:  [X] Full Code [] DNR [] DNI [] Palliative/Comfort Care    DISPOSITION: [X] NSICU

## 2025-03-18 NOTE — CHART NOTE - NSCHARTNOTEFT_GEN_A_CORE
Patient presented today for a diagnostic cerebral angiogram with planned possible right carotid angioplasty and stenting. Patient was on aspirin and plavix pre-procedure in preparation for possible stenting.   Per patient and wife at bedside, PST instructed patient to pause aspirin and plavix 5 days prior to procedure. Last dose of DAPT 5 days prior.   Discussed with Dr Perez, plan to administer aspirin 81 mg PO STAT and Brilinta 180 mg PO loading dose prior to procedure.

## 2025-03-18 NOTE — H&P ADULT - HISTORY OF PRESENT ILLNESS
The patient is a 70-year-old male with a past medical history of DM, HTN, HLD, and a pituitary adenoma who initially presented 1/17/2025 after having experienced transient right-sided weakness. 1/22/2025 ultrasound of the carotid arteries showed >70% right carotid stenosis with a bulky plaque visualized within the carotid bulb. The patient had not taken Plavix x 5 days and so was loaded with 180mg Brilinta PO pre procedure and Aspirin 81mg PO. The patient has no acute complaints on exam. NIHSS 0. NPO except medication after midnight last night. The patient presents for a planned diagnostic cerebral angiogram + right carotid angioplasty and stenting.

## 2025-03-18 NOTE — BRIEF OPERATIVE NOTE - NSICDXBRIEFPROCEDURE_GEN_ALL_CORE_FT
PROCEDURES:  Carotid artery stenting 18-Mar-2025 21:48:56  Digna Diaz  Carotid angioplasty 18-Mar-2025 21:49:04  Digna Diaz

## 2025-03-18 NOTE — PRE PROCEDURE NOTE - PRE PROCEDURE EVALUATION
Interventional Neuro Radiology  Pre-Procedure Note PA-C    HPI:  The patient is a 70-year-old male with a past medical history of DM, HTN, HLD, and a pituitary adenoma who initially presented 1/17/2025 after having experienced transient right-sided weakness. 1/22/2025 ultrasound of the carotid arteries showed >70% right carotid stenosis with a bulky plaque visualized within the carotid bulb. The patient had not taken Plavix x 5 days and so was loaded with 180mg Brilinta PO pre procedure and Aspirin 81mg PO. The patient has no acute complaints on exam. NIHSS 0. NPO except medication after midnight last night. The patient presents for a planned diagnostic cerebral angiogram + right carotid angioplasty and stenting.    Allergies: No Known Allergies    PAST MEDICAL & SURGICAL HISTORY:  HTN (hypertension)  HLD (hyperlipidemia)  DM (diabetes mellitus)  Obese  ALECIA (obstructive sleep apnea)  History of cholecystectomy  Cyst of left kidney    FAMILY HISTORY:    Current Medications: aspirin enteric coated 81 milliGRAM(s) Oral daily  atorvastatin 80 milliGRAM(s) Oral at bedtime  finasteride 5 milliGRAM(s) Oral daily  ticagrelor 90 milliGRAM(s) Oral two times a day    Labs:                         13.2   11.11 )-----------( 229      ( 18 Mar 2025 12:00 )             39.5       Blood Bank: 03-18-25  --  A POS  --    Assessment/Plan:   This is a 70-year-old male who presents for a diagnostic cerebral angiogram + right carotid angioplasty and stenting.  Procedure, goals, risks, benefits and alternatives  were discussed with patient and patient's family.  All questions were answered to best understanding.   Risks discussed include but are not limited to stroke, vessel injury, hemorrhage, and/or hematoma.  Patient demonstrates understanding  of all risks involved with this procedure and wishes to continue.     Appropriate consent was obtained from patient and consent is in the patient's chart.

## 2025-03-18 NOTE — ASU PREOP CHECKLIST - NSWEIGHTCALCTOOLDRUG_GEN_A_CORE
Detail Level: Zone Plan: Location: bilateral hands\\n\\nPatient reports hyperpigmentation on hands have lighten up a lot\\nDiscussed with patient the hyperpigmentated spots have improved\\n\\nF/u as needed\\n————\\nPt is here for blisters and hyperpigmentation on hands.\\nPt discussed she had the flare up a couple of weeks ago while in [place].\\nDiscussed with pt she had a flare up of phytophotodermatitis.\\nDiscussed with pt that she may have come into contact with limes or celery juice while in [place].\\nDiscussed with pt that phytophotodermatitis is also known as \"lime disease\" (not to be confused with Lyme disease), or \"Priscilla photodermatitis\".\\nDiscussed with pt that this is a chemical reaction which makes skin hypersensitive to ultraviolet light.\\n\\nDiscussed with pt that phytophotodermatitis usually results in hyperpigmentation of the skin that often appears like a bruise, accompanied by blisters or burning.\\nDiscussed with pt that it will eventually go away.\\nDiscussed with pt that we could prescribe HQ 6% bleaching cream at f/u to help speed up the process of evening her skin tone.\\nAdvise pt to use Cerave healing ointment daily to help reestablish healthy skin barrier.\\nF/u as needed Action 4: Continue Other Instructions: Location: right leg\\nPrescribe: Tretinoin 0.05%/HQ 4% — apply to affected areas at night \\nContinue treatment: Olux-E 0.05% topical foam \\n\\nPatient using Olux-E topical foam daily with great benefit but ran out recently\\nDiscussed with patient the plaques look cleared up and she needs to get a refill of Olux-E for future flares \\nDiscussed with patient the healed plaques have left brown hyperpigmentation so we will prescribe her a bleaching cream she can use to lighten the skin\\nToday, will prescribe Tretinoin 0.05%/HQ 4% for patient to apply to affected areas \\n\\nPatient presents dry/flaky skin on right leg that has come and go for many years.\\nEducated patient about psoriasis and explained in great detail that this is caused by his immune system attacking the skin/becoming angry, during times of stress or illness.\\nDiscussed with patient that there will be times that he feels as if his skin is completely clear, and that there will be times where his psoriasis is very active.\\n\\nEducated patient about how to use medications that we will use to treat this condition, and educated patient on the important of maintaining a healthy skin barrier by using Cerave Moisturizing Cream/Healing Ointment daily.\\nAdvised pt to only lather soap under the arms, chest, and in private areas.\\n\\nF/u as needed Plan: Location: right knee and right ankle \\nPrescribed: HQ 4% Tretinoin 0.05% compound cream QHS\\n\\nPt has hyperpigmentation on face today.\\nDiscussed with pt that this pigmentation is due to sun damage to the skin, advised patient to use SPF daily.\\nDiscussed with pt that we will start patient on HQ 4% Tretinoin 0.05% to use nightly.\\nDiscussed with pt to apply a pea size amount to face, pushing into pores. \\nDiscussed with pt to avoid areas around eyes, nasal crease, and around the lips since skin is thin and may get irritated easily.\\n\\nDiscussed with pt that it will come in a box that says keep refrigerated.\\nDiscussed with pt that they do not need to keep it refrigerated, but have to keep it in a dark place since sunlight may oxidize it.\\nDiscussed with pt that if skin becomes irritated while using cream, then pt can change frequency to every other night, or every other other night, etc.\\nDiscussed with pt that results are not immediate and may take up to a month to notice change.\\nAdvised pt to apply a moisturizing cream such as Cerave afterwards to help reestablish a healthy skin barrier.\\nF/u as needed  used

## 2025-03-18 NOTE — H&P ADULT - NSHPPHYSICALEXAM_GEN_ALL_CORE
General: In no acute distress  Skin: Warm, dry, no rashes or lesions observed  Head: Normocephalic, atraumatic  Eyes: PERRLA, EOMI, sclera and conjunctiva without erythema or discharge  ENT: No nasal discharge, airway clear, MMM  Neck: supple and nontender, no lymphadenopathy noted  Cardiac: Regular rate and rhythm  Respiratory: Equal chest expansion bilaterally  Neurological: AAOx3, following 2-step commands, V1-V3  intact to light touch, moving all extremities antigravity without drift, sensation intact to light touch throughout, EOMI, PERRLA, visual fields full, no dysarthria, no aphasia, no dysmetria on FTN or HTS testing

## 2025-03-18 NOTE — CHART NOTE - NSCHARTNOTEFT_GEN_A_CORE
PACU ANESTHESIA ADMISSION NOTE      Procedure:   Post op diagnosis:      ____  Intubated  TV:______       Rate: ______      FiO2: ______    __x__  Patent Airway    __x__  Full return of protective reflexes    __x__  Full recovery from anesthesia / back to baseline     Vitals:   T:  37         R:  18                BP:  150/60                Sat: 100                  P: 76      Mental Status:  __x__ Awake   __x___ Alert   _____ Drowsy   _____ Sedated    Nausea/Vomiting:  _x___ NO  ______Yes,   See Post - Op Orders          Pain Scale (0-10):  ___0__    Treatment: __x__ None    ____ See Post - Op/PCA Orders    Post - Operative Fluids:   __x__ Oral   ____ See Post - Op Orders    Plan: Discharge:   ____Home       _____Floor     __x___Critical Care    _____  Other:_________________    Comments:

## 2025-03-18 NOTE — ASU PATIENT PROFILE, ADULT - NSTOBACCONEVERSMOKERY/N_GEN_A
11/2/2018    [facility]  [address] [address]    RE: Delbert SORIA Jarek       Dear Colleague,    I had the pleasure of seeing Delbert Tilley in the [facility].    Cardiology Clinic Progress Note  Delbert Tilley MRN# [id number]   YOB: 1947 Age: 70 year old     Reason For Visit:  2 month follow-up    Primary Cardiologist:   Dr. Michaels          History of Presenting Illness:    Delbert Tilley is a pleasant 70 year old patient with a past cardiac history significant for a couple episodes of 30 seconds of paroxysmal atrial fibrillation not on anticoagulation, CAD status post CABG (LIMA to LAD, SVG to OM, and SVG to PDA) and PCI to OM3 graft 2014, status post right and left carotid endarterectomy, status post flutter ablation, and ischemic combined with nonischemic cardiomyopathy with previous LVEF 35-40% now decreased to 20-25% and status post ICD. Dry weight 161 pounds.     In 8/2017 he had a stress test showing a large transmural inferior and inferoseptal defect without ischemia but LVEF was 28%. Echocardiogram 11/2017 confirmed LVEF decreased to 20-25% with mildly dilated LV, severe global hypokinesia, inferior and inferolateral akinesia, it was suggestive of restrictive physiology, moderate MR, mild TR, moderate pulmonary hypertension, normal IVC, and RV normal size and function. The patient had previously had an ICD placed. He had heart failure and underwent angiogram.  This showed a widely patent LIMA to LAD with 50% stenosis in the apical LAD, vein graft to the RCA was occluded, vein graft to the OM 3 was patent, and left main coronary artery was patent.  There was recommendation for possible BiV upgrade.     Patient was last seen by Dr. Michaels on 8/20/2018.  He unfortunately continued to smoke and noted that he had peripheral neuropathy and arthritic hands.  He was doing well from a cardiac standpoint.  For his cardiomyopathy, it was recommended  that he increase his losartan to 50 mg daily and increase metoprolol to 25 mg daily.  At follow-up, he should be started on spironolactone 12.5 mg daily.  If the patient failed to quit smoking, he should be started on DAPT.  Echocardiogram in 3-6 months to reassess LVEF.    Pt presents today for 2 month follow-up.  He unfortunately continues smoking.  I have asked him to contact us if we can be of any help.  I have asked him to get a BMP today since this was not checked after previously increasing losartan.  He is agreeable to starting spironolactone for his cardiomyopathy.  I have counseled him to watch for symptoms of hypotension after starting this.  His blood pressure is well-controlled today.  He denies any side effects after previously increasing metoprolol and losartan. I discussed DAPT given his continued smoking.  Unfortunately, he has been taking 1000 mg of aspirin daily for his arthritis.  Therefore, I am hesitant to start him on Plavix in addition to this.  I have asked him to try Tylenol arthritis so that we can decrease his aspirin back to 81 mg daily and then start Plavix. His weight is stable today in the clinic.  He has been checking these at home and occasionally will see a 5 pound weight increase over the course of 2-3 days.  When this happens, he takes an extra Lasix for a couple days and weight comes back down with resolution of his lower extremity edema.  This has happened approximately 3 times in the last 3 months.  He has been wearing compression stockings approximately every other day.  He continues with chronic stable shortness of breath which he believes is more related to lungs.  He has not been formally diagnosed with lung disease.  He has recently been doing yard work and raking leaves and denies any chest discomfort or angina. Patient reports no chest pain, PND, orthopnea, presyncope, syncope, heart racing, or palpitations.      Current Cardiac Medications   Aspirin 1000 mg  daily  Tylenol 1000 mg daily  Nitroglycerin p.r.n.  Metoprolol XL 25 mg daily  Atorvastatin 80 mg daily   Losartan 50 mg daily   Lasix 20 mg daily and 40 mg daily PRN Wt gain                      Assessment and Plan:     Plan  1.  Start spironolactone 12.5 mg daily for cardiomyopathy  2.  BMP today after previously increasing losartan  3.  Call if blood pressure is less than 90 on top or less than 100 with lightheadedness.    4.  Check daily weights and call the clinic if your weight has increased more than 2 lbs in one day or 5 lbs in one week.  5.  Follow-up with MARISOL in 1 month with BMP prior after starting spironolactone  6.  Reassess LVEF with echo, 3 months after uptitrating cardiomyopathy medications       1. CAD    CABG (LIMA to LAD, SVG to OM, and SVG to PDA) years ago with PCI to SVG-OM3 2014    PARDO with walking long distances and going up stairs, not as significant as when he needed bypass- stable    Continue statin, aspirin, ARB, beta blocker      2. Ischemic cardiomyopathy    Status post ICD with generator replaced     LVEF 20-25%, previously 35-40%    Occasional LE edema with higher Na intake- resolved with lasix and compression stockings     Continue beta blocker, ARB, lasix, spironolactone     If patient develops left bundle branch block, may consider bi-V ICD upgrade in the future    uptitrate cardiomyopathy medications       3. Paroxysmal atrial fibrillation/flutter    status post flutter ablation    History of a couple episodes of 30 seconds of atrial fibrillation    No symptoms of atrial fibrillation and no atrial fibrillation seen on device checks    Continue rate control with metoprolol and no current anticoagulation      4. PVD    status post right and left carotid endarterectomy    following with vascular surgery        Thank you for allowing me to participate in this delightful patient's care.      This note was completed in part using Dragon voice recognition software. Although reviewed  No after completion, some word and grammatical errors may occur.    Yareli Kim, APRN, CNP           Data:   All laboratory data reviewed        HPI and Plan:   See dictation    Orders Placed This Encounter   Procedures     Basic metabolic panel     Basic metabolic panel     Follow-Up with Cardiac Advanced Practice Provider       Orders Placed This Encounter   Medications     spironolactone (ALDACTONE) 25 MG tablet     Sig: Take 0.5 tablets (12.5 mg) by mouth daily     Dispense:  15 tablet     Refill:  11       There are no discontinued medications.      Encounter Diagnoses   Name Primary?     Ischemic cardiomyopathy Yes     Chronic systolic heart failure (H)      Benign essential hypertension      Coronary artery disease involving coronary bypass graft of native heart without angina pectoris        CURRENT MEDICATIONS:  Current Outpatient Prescriptions   Medication Sig Dispense Refill     albuterol (PROAIR HFA) 108 (90 Base) MCG/ACT Inhaler Inhale 2 puffs into the lungs every 4 hours as needed for shortness of breath / dyspnea 1 Inhaler 0     amylase-lipase-protease (CREON 12) 03755 UNITS CPEP Take 1 capsule (12,000 Units) by mouth daily 120 capsule 0     aspirin 325 MG tablet Take 3 tablets (975 mg) by mouth every morning (Takes 2 x 500 mg = 1000 mg) 120 tablet 1     atorvastatin (LIPITOR) 80 MG tablet Take 80 mg by mouth At Bedtime        furosemide (LASIX) 20 MG tablet Take 1 tablet (20 mg) by mouth daily       insulin glargine (LANTUS) 100 UNIT/ML injection Inject 27 Units Subcutaneous every morning       loperamide (IMODIUM) 2 MG capsule Take 2 mg by mouth 4 times daily as needed for diarrhea       losartan (COZAAR) 50 MG tablet Take 1 tablet (50 mg) by mouth daily 90 tablet 3     metolazone (ZAROXOLYN) 2.5 MG tablet Take 2.5 mg on Tuesday, 1/30/18 and Thursday 2/1/18. Take this 1/2 hour before taking your Lasix (furosemide) 4 tablet 0     metoprolol succinate (TOPROL-XL) 25 MG 24 hr tablet  Take 1 tablet (25 mg) by mouth daily 90 tablet 3     Nitroglycerin (NITROSTAT SL) Place 0.4 mg under the tongue every 5 minutes as needed for chest pain       pantoprazole (PROTONIX) 40 MG EC tablet Take 1 tablet (40 mg) by mouth daily 30 tablet      spironolactone (ALDACTONE) 25 MG tablet Take 0.5 tablets (12.5 mg) by mouth daily 15 tablet 11     Insulin Pen Needle (PEN NEEDLES) 32G X 4 MM MISC          ALLERGIES     Allergies   Allergen Reactions     Hydrocodone      Upset stomach     Shellfish Allergy Hives and Rash       PAST MEDICAL HISTORY:  Past Medical History:   Diagnosis Date     Acute renal failure (H) 8/26/06 Hosp    secondary to dehydration     Arthritis      CAD (coronary artery disease)     LIMA to the LAD, vein graft to OM and a vein graft to the PDA     Carpal tunnel syndrome      Diabetes (H)      Gastro-oesophageal reflux disease      H/O: alcohol abuse      HTN (hypertension)      Hyperlipidaemia      Hyperlipidaemia LDL goal <100 7/8/2013     Hypokalemia      Pacemaker      Pancreatitis 6/26/06 Hosp       PAST SURGICAL HISTORY:  Past Surgical History:   Procedure Laterality Date     BYPASS CARDIOPULMONARY       CATARACT IOL, RT/LT      Cataract IOL RT/LT     ENDARTERECTOMY CAROTID Right 6/12/2015    Procedure: ENDARTERECTOMY CAROTID;  Surgeon: João Turner MD;  Location:  OR     ENDARTERECTOMY CAROTID Left 9/8/2017    Procedure: ENDARTERECTOMY CAROTID;  LEFT CAROTID ENDARTERECTOMY WITH EEG;  Surgeon: João Turner MD;  Location:  OR     IMPLANT PACEMAKER  6/10/2010     RELEASE CARPAL TUNNEL Right 4/12/2016    Procedure: RELEASE CARPAL TUNNEL;  Surgeon: Lissy Leger MD;  Location: [place] OR     SURGICAL HISTORY OF -   1998    Laminectomy     SURGICAL HISTORY OF -   10/2003    Bypass grafting     TONSILLECTOMY & ADENOIDECTOMY         FAMILY HISTORY:  Family History   Problem Relation Age of Onset     Unknown/Adopted No family hx of        SOCIAL HISTORY:  Social History      Social History     Marital status: Single     Spouse name: N/A     Number of children: N/A     Years of education: N/A     Social History Main Topics     Smoking status: Current Every Day Smoker     Packs/day: 2.00     Types: Cigarettes     Smokeless tobacco: Never Used      Comment: 1 1/2 PPD 2/16/18     Alcohol use No     Drug use: No     Sexual activity: Not Currently     Partners: Female     Other Topics Concern     Parent/Sibling W/ Cabg, Mi Or Angioplasty Before 65f 55m? No     Social History Narrative       Review of Systems:  Skin:  Negative       Eyes:  Positive for glasses    ENT:  Negative      Respiratory:  Negative       Cardiovascular:    Positive for;lower extremity symptoms;edema;fatigue;lightheadedness;dizziness    Gastroenterology: Negative      Genitourinary:  Positive for urinary frequency;urgency    Musculoskeletal:  Positive for joint pain;joint swelling;joint stiffness    Neurologic:  Positive for numbness or tingling of hands;numbness or tingling of feet    Psychiatric:  Negative      Heme/Lymph/Imm:  Positive for allergies    Endocrine:  Positive for diabetes      Physical Exam:  Vitals: /65 (BP Location: Right arm, Patient Position: Sitting, Cuff Size: Adult Regular)  Pulse 80  Wt 73 kg (161 lb)  SpO2 97%  BMI 23.1 kg/m2    Constitutional:  cooperative, alert and oriented, well developed, well nourished, in no acute distress        Skin:  warm and dry to the touch          Head:  normocephalic        Eyes:  sclera white        Lymph:      ENT:  no pallor or cyanosis        Neck:  JVP normal        Respiratory:  clear to auscultation;normal symmetry diminished breath sounds bilaterally       Cardiac: regular rhythm, normal S1/S2, no S3 or S4, apical impulse not displaced, no murmurs, gallops or rubs occasional premature beats              pulses full and equal                                        GI:  abdomen soft        Extremities and Muscular Skeletal:      bilateral LE  edema;1+;pitting          Neurological:  no gross motor deficits;affect appropriate        Psych:  Alert and Oriented x 3          Thank you for allowing me to participate in the care of your patient.    Sincerely,     ANA Givens [facility]

## 2025-03-18 NOTE — CONSULT NOTE ADULT - NS ATTEND OPT1A GEN_ALL_CORE
CARDIOLOGY PROGRESS NOTE      Patient Name: Calton Litten  Date of admission: 7/26/2021 12:21 PM  Admission Dx: Sepsis ([facility] [address] [A41.9]  Reason for Consult:  Elevated troponin, CAD, CM, hypotension  Requesting Physician: Rosas Rodríguez MD  Primary Care physician: Claudio Shah PA-C    Subjective:     Calton Litten is a 63 y.o. patient with a prior history notable for coronary artery disease with prior PCI, pAF, atrial flutter, chronic AC, ischemic CM with EF 25%, status post cardiomems, COPD, DM, PAD, obesity, CLINT, CKD and non-compliance, who presented to the hospital 7/26/21 for altered mental status. Diagnosed with sepsis. Assess by Dr. Sven Suarez 7/27 for indications above. Elevated troponins with flat trend, Pro BNP 7507. SCR 5.4 from baseline 2.0-2.4. CXR showed stable cardiomegaly with no infiltrates/edema. Denied chest pain. Baseline dyspnea noted. Following assessment, diuretics held, blood thinners held due to anemia, and statin held due to elevated liver enzymes. Continues to smoke. Resides at [facility]. Patient weaned from pressors in interim. On anti-microbial therapy. MRCP planned today. Nephrology consulted for likely ATN, no plans for HD at this time as improved today. .     She is sitting bedside up in chair this PM. She states she feels well. Still having some memory issues per her daughter who's with her today. No dizziness. No chest pain. No dyspnea. Legs wrapped for edema. Home Medications:  Were reviewed and are listed in nursing record and/or below  Prior to Admission medications    Medication Sig Start Date End Date Taking?  Authorizing Provider   acetaminophen (TYLENOL) 650 MG extended release tablet Take 650 mg by mouth every 4 hours as needed for Pain   Yes Historical Provider, MD   Umeclidinium Bromide (INCRUSE ELLIPTA) 62.5 MCG/INH AEPB Inhale 1 Dose into the lungs daily   Yes Historical Provider, MD   fluticasone-salmeterol (ADVAIR) 250-50 MCG/DOSE AEPB Inhale 1 puff into the lungs 2 times daily   Yes Aditi Huang MD   apixaban (ELIQUIS) 5 MG TABS tablet Take 1 tablet by mouth 2 times daily 7/14/21  Yes Jannie Jacobs MD   metoprolol tartrate (LOPRESSOR) 25 MG tablet Take 1 tablet by mouth 2 times daily 7/14/21  Yes Jannie Jacobs MD   miconazole (MICOTIN) 2 % powder Apply topically 2 times daily. 7/14/21  Yes Jannie Jacobs MD   torsemide (DEMADEX) 10 MG tablet Take 5 tablets by mouth daily 7/15/21  Yes Jannie Jacobs MD   potassium chloride (KLOR-CON M) 20 MEQ extended release tablet Take 2 tablets by mouth daily 7/14/21  Yes Jannie Jacobs MD   Insulin Degludec (TRESIBA FLEXTOUCH) 100 UNIT/ML SOPN Inject 3 Units into the skin nightly 7/1/21  Yes Payal Simon MD   Cholecalciferol (VITAMIN D) 25 MCG TABS Take 1 tablet by mouth daily 7/1/21  Yes Payal Simon MD   QUEtiapine (SEROQUEL) 25 MG tablet Take 1 tablet by mouth nightly 7/1/21 7/31/21 Yes Payal Simon MD   Blood Glucose Monitoring Suppl (ONE TOUCH ULTRA 2) w/Device KIT USE TO MONITOR BLOOD GLUCOSE LEVELS 5/26/21  Yes TRAY Mixon CNP   ONE TOUCH ULTRASOFT LANCETS MISC USE TO TEST BLOOD GLUCOSE LEVELS 4 TIMES DAILY 5/26/21  Yes TRAY Mixon CNP   blood glucose test strips (ASCENSIA AUTODISC VI;ONE TOUCH ULTRA TEST VI) strip USE TO MONITOR BLOOD GLUCOSE LEVELS 4 TIMES DAILY 5/26/21  Yes TRAY Mixon CNP   nitroGLYCERIN (NITROSTAT) 0.4 MG SL tablet Place 1 tablet under the tongue every 5 minutes as needed for Chest pain up to max of 3 total doses.  If no relief after 1 dose, call 911. 2/23/21  Yes TRAY Carrillo CNP   Insulin Pen Needle (B-D ULTRAFINE III SHORT PEN) 31G X 8 MM MISC Five shots a day 2/1/21  Yes TRAY Mixon CNP   atorvastatin (LIPITOR) 80 MG tablet TAKE 1 TABLET BY MOUTH EVERY DAY AT NIGHT 1/11/21  Yes Claire Rob Sheryl, APRN - CNP   blood glucose test strips (FREESTYLE LITE) strip USE TO CHECK BLOOD GLUCOSE LEVELS FOUR TIMES DAILY 11/17/20  Yes Marylou Daniels MD   benztropine (COGENTIN) 1 MG tablet Take 1 mg by mouth nightly  12/13/19  Yes Historical Provider, MD   INSULIN SYRINGE .5CC/29G 29G X 1/2\" 0.5 ML MISC 1 each by Does not apply route daily 12/3/19  Yes Sherral Bowels, APRN - CNP   pantoprazole (PROTONIX) 40 MG tablet Take 1 tablet by mouth 2 times daily  Patient taking differently: Take 20 mg by mouth 2 times daily  10/1/19  Yes Landry Malik MD   clopidogrel (PLAVIX) 75 MG tablet TAKE 1 TABLET BY MOUTH EVERY DAY 9/3/19  Yes Jose Sites, APRN - CNP   magnesium oxide (MAG-OX) 400 (240 Mg) MG tablet TAKE 1 TABLET ONCE DAILY 5/1/19  Yes Kalina Bustamante MD   busPIRone (BUSPAR) 30 MG tablet Take 30 mg by mouth 2 times daily  4/2/18  Yes Historical Provider, MD   lamoTRIgine (LAMICTAL) 200 MG tablet Take 200 mg by mouth 2 times daily    Yes Historical Provider, MD        CURRENT Medications:  insulin lispro (HUMALOG) injection vial 0-18 Units, Q4H  mupirocin (BACTROBAN) 2 % ointment, BID  miconazole (MICOTIN) 2 % powder, BID  pantoprazole (PROTONIX) injection 40 mg, Daily  sodium chloride flush 0.9 % injection 5-40 mL, 2 times per day  sodium chloride flush 0.9 % injection 5-40 mL, PRN  0.9 % sodium chloride infusion, PRN  ondansetron (ZOFRAN-ODT) disintegrating tablet 4 mg, Q8H PRN   Or  ondansetron (ZOFRAN) injection 4 mg, Q6H PRN  polyethylene glycol (GLYCOLAX) packet 17 g, Daily PRN  acetaminophen (TYLENOL) tablet 650 mg, Q6H PRN   Or  acetaminophen (TYLENOL) suppository 650 mg, Q6H PRN  glucose (GLUTOSE) 40 % oral gel 15 g, PRN  dextrose 50 % IV solution, PRN  glucagon (rDNA) injection 1 mg, PRN  dextrose 5 % solution, PRN  cefepime (MAXIPIME) 1000 mg IVPB minibag, Q24H  vancomycin (VANCOCIN) intermittent dosing (placeholder), RX Placeholder        Allergies:  Lisinopril     Review of Systems:   A 14 point review of symptoms completed. Pertinent positives identified in the HPI, all other review of symptoms negative. LABGLOM 10 07/28/2021    GLUCOSE 205 07/28/2021    PROT 5.7 07/28/2021    CALCIUM 7.3 07/28/2021    BILITOT 1.8 07/28/2021    ALKPHOS 309 07/28/2021     07/28/2021     07/28/2021     PT/INR:  No results found for: PTINR  HgBA1c:  Lab Results   Component Value Date    LABA1C 11.9 05/20/2021     Lab Results   Component Value Date    CKTOTAL 86 06/27/2021    TROPONINI 0.04 (H) 07/26/2021         Interval Testing/Data:     Telemetry personally reviewed. WCT, flutter vs. sinus    EKG's personally reviewed. Impression and Plan      1. Elevated troponin with flat trend, due to septic shock, acute kidney injury, consistent with Type II event. 2.  Septic shock  3. Acute Encephalopathy, improved. 4.  Coronary artery disease, status post angioplasty with stents and  coronary artery bypass surgery, currently stable. 5.  Acute-on-chronic kidney disease. 6. Tachycardic, persistent ~100, possible  Atrial flutter  7. Paroxysmal atrial fibrillation    8. Chronic anticoagulation. 9.  Ischemic cardiomyopathy, ejection fraction of 25%. 10.  Status post CardioMEMS. 11.  Chronic diastolic congestive heart failure, currently compensated. 12.  Anemia, progressive, new. 13.  Peripheral arterial disease including left subclavian stenosis. 14.  COPD. 15.  Diabetes. 16.  Sleep apnea. 17.  Morbid obesity. 18.  Noncompliance. 19. Hyponatremia   20.  Acute liver injury - INR 2.5 today, LFT's down-trending     Patient Active Problem List   Diagnosis    Type 2 diabetes mellitus with diabetic polyneuropathy, with long-term current use of insulin ([facility] [address]    Essential hypertension    CLINT (obstructive sleep apnea)    Tobacco abuse disorder    GERD (gastroesophageal reflux disease)    Anxiety and depression    Hyponatremia    Iron deficiency anemia    CAD (coronary artery disease)    Mitral valve regurgitation    COPD (chronic obstructive pulmonary disease) ([address]    SHAUNNA (acute kidney injury) ([facility] [address]    Vitamin D deficiency    Dyslipidemia    Abel's esophagus    Acute on chronic congestive heart failure (HCC)    Viral hepatitis C    Pulmonary nodule    Class 2 severe obesity due to excess calories with serious comorbidity and body mass index (BMI) of 39.0 to 39.9 in adult ([facility] [address]    Bipolar disorder (HCC)    Pulmonary hypertension (HCC)    Lower extremity edema    Habitual self-excoriation    Ulcer of left lower leg, limited to breakdown of skin (HCC)    Ulcer of right leg, limited to breakdown of skin (HCC)    Arthritis    Cardiomyopathy ([facility] [address]    Chronic systolic congestive heart failure (HCC)    Chronic renal impairment    Peripheral venous insufficiency    PAD (peripheral artery disease) ([facility] [address]    Acute kidney injury superimposed on CKD ([facility] [address]    Morbid obesity with BMI of 40.0-44.9, adult (HCC)    CKD (chronic kidney disease) stage 4, GFR 15-29 ml/min (HCC)    Hypokalemia    Dyspnea on exertion    Ischemic heart disease    Moderate protein-calorie malnutrition (HCC)    DKA, type 2, not at goal [facility])    Acute on chronic systolic CHF (congestive heart failure) (HCC)    Acute systolic CHF (congestive heart failure) (HCC)    CHF (congestive heart failure), NYHA class I, acute on chronic, combined (HCC)    Cellulitis    Closed nondisplaced fracture of navicular bone of left foot    Heart failure (HCC)    Generalized weakness    Sepsis ([facility] [address]    Acute encephalopathy       PLAN:  1. Holding torsemide, continue to hold given soft pressures. Restart metoprolol 25 mg BID, repeat EKG for likely atrial flutter. 2. Holding plavix/eliquis given H/H trend and in anticipation of possible temporary dialysis catheter. Restart when deemed appropriate for continued stroke ppx and management of CAD  3. Holding statin until LFT's normalize; follow up MRCP    We will follow. I will address the patient's cardiac risk factors and adjusted pharmacologic treatment as needed.  In addition, I have reinforced the need for patient directed risk factor modification. All questions and concerns were addressed to the patient/family. Alternatives to my treatment were discussed. Thank you for allowing us to participate in the care of [address] [place] [address]. Please call me with any questions [phone].     Miguelangel Miranda MD, [facility]  Cardiovascular Disease  [facility]  [phone] [facility]  [phone] [address]  7/28/2021 2:45 PM History/Exam/Medical decision making

## 2025-03-18 NOTE — H&P ADULT - ASSESSMENT
The patient is a 70-year-old male with a past medical history of DM, HTN, HLD, and a pituitary adenoma who initially presented 1/17/2025 after having experienced transient right-sided weakness. 1/22/2025 ultrasound of the carotid arteries showed >70% right carotid stenosis with a bulky plaque visualized within the carotid bulb. The patient had not taken Plavix x 5 days and so was loaded with 180mg Brilinta PO pre procedure and Aspirin 81mg PO. The patient has no acute complaints on exam. NIHSS 0. NPO except medication after midnight last night. The patient presents for a planned diagnostic cerebral angiogram + right carotid angioplasty and stenting.    Plan:  Admit to neuro ICU s/p procedure     #Neuro:  - NIHSS vitals arteriotomy site and pulses q 15 min x 2 hr q 30 min x 6 hr q 1 hr x 16 hr total 24 hr recovery,   - DAPT Brilinta 90mg q 12 hours and Aspirin 81mg daily last dose 3/18 AM  - PT/OT  - Stat CTH if any worsening or deterioration in neurological examination and call [facility]/Neurology    #CV:  - -140  - PRN Labetalol for SBP >140    #Resp:  - HOB   - Aspiration precautions  - Keep SaO2 > 95%    #Renal/Fluid/Electrolytes:  - Strict I/Os  - Keep euvolemic  - Keep normonatremic   - Keep Magnesium level > 2  - Monitor lytes, replete as needed    #GI:  - Dysphagia screening/speech and swallow evaluation s/p procedure     #Heme/Onc:  - SCS while in bed    #Endo:  - Keep normoglycemic  - Diabetes orders/ protocol  - Consistent carb diet    #ID:  - Keep normothermic; avoid fevers    Code status: Full code  Disposition: ICU

## 2025-03-18 NOTE — CONSULT NOTE ADULT - NS ATTEND AMEND GEN_ALL_CORE FT
70-year-old male s/p elective DSA + R carotid angioplasty and stenting 3/18/25    pt seen examined, chart reviewed in full  above care plan updated with ACP with following addition    nihss q1  AP, statin  sbp 110-140  monitor for hyperperfusion syndrome  PT/OT speech  neuroIR followup  dvt ppx

## 2025-03-18 NOTE — BRIEF OPERATIVE NOTE - COMMENTS
-140   Admit to the neuro ICU  Continue Brilinta 90mg bid and Aspirin 81mg daily (loading dose Brilinta today)

## 2025-03-19 VITALS
RESPIRATION RATE: 18 BRPM | OXYGEN SATURATION: 96 % | HEART RATE: 70 BPM | SYSTOLIC BLOOD PRESSURE: 128 MMHG | DIASTOLIC BLOOD PRESSURE: 79 MMHG

## 2025-03-19 LAB
A1C WITH ESTIMATED AVERAGE GLUCOSE RESULT: 8.7 % — HIGH (ref 4–5.6)
ALBUMIN SERPL ELPH-MCNC: 4 G/DL — SIGNIFICANT CHANGE UP (ref 3.5–5.2)
ALP SERPL-CCNC: 41 U/L — SIGNIFICANT CHANGE UP (ref 30–115)
ALT FLD-CCNC: 13 U/L — SIGNIFICANT CHANGE UP (ref 0–41)
ANION GAP SERPL CALC-SCNC: 12 MMOL/L — SIGNIFICANT CHANGE UP (ref 7–14)
APTT BLD: 29.3 SEC — SIGNIFICANT CHANGE UP (ref 27–39.2)
AST SERPL-CCNC: 19 U/L — SIGNIFICANT CHANGE UP (ref 0–41)
BASOPHILS # BLD AUTO: 0.04 K/UL — SIGNIFICANT CHANGE UP (ref 0–0.2)
BASOPHILS NFR BLD AUTO: 0.4 % — SIGNIFICANT CHANGE UP (ref 0–1)
BILIRUB SERPL-MCNC: 0.5 MG/DL — SIGNIFICANT CHANGE UP (ref 0.2–1.2)
BUN SERPL-MCNC: 18 MG/DL — SIGNIFICANT CHANGE UP (ref 10–20)
CALCIUM SERPL-MCNC: 9.4 MG/DL — SIGNIFICANT CHANGE UP (ref 8.4–10.5)
CHLORIDE SERPL-SCNC: 104 MMOL/L — SIGNIFICANT CHANGE UP (ref 98–110)
CHOLEST SERPL-MCNC: 134 MG/DL — SIGNIFICANT CHANGE UP
CO2 SERPL-SCNC: 23 MMOL/L — SIGNIFICANT CHANGE UP (ref 17–32)
CREAT SERPL-MCNC: 0.9 MG/DL — SIGNIFICANT CHANGE UP (ref 0.7–1.5)
EGFR: 92 ML/MIN/1.73M2 — SIGNIFICANT CHANGE UP
EGFR: 92 ML/MIN/1.73M2 — SIGNIFICANT CHANGE UP
EOSINOPHIL # BLD AUTO: 0.2 K/UL — SIGNIFICANT CHANGE UP (ref 0–0.7)
EOSINOPHIL NFR BLD AUTO: 1.8 % — SIGNIFICANT CHANGE UP (ref 0–8)
ESTIMATED AVERAGE GLUCOSE: 203 MG/DL — HIGH (ref 68–114)
GLUCOSE BLDC GLUCOMTR-MCNC: 183 MG/DL — HIGH (ref 70–99)
GLUCOSE SERPL-MCNC: 164 MG/DL — HIGH (ref 70–99)
HCT VFR BLD CALC: 35.1 % — LOW (ref 42–52)
HDLC SERPL-MCNC: 31 MG/DL — LOW
HGB BLD-MCNC: 12 G/DL — LOW (ref 14–18)
IMM GRANULOCYTES NFR BLD AUTO: 0.7 % — HIGH (ref 0.1–0.3)
INR BLD: 0.94 RATIO — SIGNIFICANT CHANGE UP (ref 0.65–1.3)
LIPID PNL WITH DIRECT LDL SERPL: 65 MG/DL — SIGNIFICANT CHANGE UP
LYMPHOCYTES # BLD AUTO: 1.04 K/UL — LOW (ref 1.2–3.4)
MCHC RBC-ENTMCNC: 30.8 PG — SIGNIFICANT CHANGE UP (ref 27–31)
MCV RBC AUTO: 90 FL — SIGNIFICANT CHANGE UP (ref 80–94)
MONOCYTES # BLD AUTO: 0.86 K/UL — HIGH (ref 0.1–0.6)
MONOCYTES NFR BLD AUTO: 7.8 % — SIGNIFICANT CHANGE UP (ref 1.7–9.3)
NEUTROPHILS # BLD AUTO: 8.8 K/UL — HIGH (ref 1.4–6.5)
NEUTROPHILS NFR BLD AUTO: 79.9 % — HIGH (ref 42.2–75.2)
NRBC BLD AUTO-RTO: 0 /100 WBCS — SIGNIFICANT CHANGE UP (ref 0–0)
PHOSPHATE SERPL-MCNC: 3.2 MG/DL — SIGNIFICANT CHANGE UP (ref 2.1–4.9)
PLATELET # BLD AUTO: 228 K/UL — SIGNIFICANT CHANGE UP (ref 130–400)
PMV BLD: 10.4 FL — SIGNIFICANT CHANGE UP (ref 7.4–10.4)
POTASSIUM SERPL-MCNC: 3.9 MMOL/L — SIGNIFICANT CHANGE UP (ref 3.5–5)
POTASSIUM SERPL-SCNC: 3.9 MMOL/L — SIGNIFICANT CHANGE UP (ref 3.5–5)
PROTHROM AB SERPL-ACNC: 11.1 SEC — SIGNIFICANT CHANGE UP (ref 9.95–12.87)
RBC # BLD: 3.9 M/UL — LOW (ref 4.7–6.1)
RBC # FLD: 13.3 % — SIGNIFICANT CHANGE UP (ref 11.5–14.5)
SODIUM SERPL-SCNC: 139 MMOL/L — SIGNIFICANT CHANGE UP (ref 135–146)
TRIGL SERPL-MCNC: 233 MG/DL — HIGH
TSH SERPL-MCNC: 1.59 UIU/ML — SIGNIFICANT CHANGE UP (ref 0.27–4.2)
WBC # BLD: 11.02 K/UL — HIGH (ref 4.8–10.8)
WBC # FLD AUTO: 11.02 K/UL — HIGH (ref 4.8–10.8)

## 2025-03-19 PROCEDURE — 99232 SBSQ HOSP IP/OBS MODERATE 35: CPT

## 2025-03-19 RX ORDER — MAGNESIUM SULFATE 500 MG/ML
2 SYRINGE (ML) INJECTION ONCE
Refills: 0 | Status: COMPLETED | OUTPATIENT
Start: 2025-03-19 | End: 2025-03-19

## 2025-03-19 RX ORDER — FENOFIBRATE 160 MG/1
145 TABLET ORAL DAILY
Refills: 0 | Status: DISCONTINUED | OUTPATIENT
Start: 2025-03-19 | End: 2025-03-19

## 2025-03-19 RX ORDER — TICAGRELOR 90 MG/1
1 TABLET ORAL
Qty: 60 | Refills: 5
Start: 2025-03-19 | End: 2025-09-14

## 2025-03-19 RX ORDER — ACETAMINOPHEN 500 MG/5ML
1000 LIQUID (ML) ORAL ONCE
Refills: 0 | Status: COMPLETED | OUTPATIENT
Start: 2025-03-19 | End: 2025-03-19

## 2025-03-19 RX ADMIN — Medication 1000 MILLIGRAM(S): at 05:00

## 2025-03-19 RX ADMIN — Medication 400 MILLIGRAM(S): at 04:09

## 2025-03-19 RX ADMIN — INSULIN LISPRO 2: 100 INJECTION, SOLUTION INTRAVENOUS; SUBCUTANEOUS at 11:30

## 2025-03-19 RX ADMIN — Medication 5 MILLIGRAM(S): at 08:22

## 2025-03-19 RX ADMIN — Medication 40 MILLIEQUIVALENT(S): at 00:27

## 2025-03-19 RX ADMIN — Medication 1 TABLET(S): at 11:39

## 2025-03-19 RX ADMIN — Medication 25 GRAM(S): at 00:27

## 2025-03-19 RX ADMIN — Medication 5 MILLIGRAM(S): at 00:55

## 2025-03-19 RX ADMIN — FENOFIBRATE 145 MILLIGRAM(S): 160 TABLET ORAL at 11:39

## 2025-03-19 RX ADMIN — Medication 81 MILLIGRAM(S): at 11:39

## 2025-03-19 RX ADMIN — INSULIN LISPRO 2: 100 INJECTION, SOLUTION INTRAVENOUS; SUBCUTANEOUS at 07:58

## 2025-03-19 RX ADMIN — LOSARTAN POTASSIUM 25 MILLIGRAM(S): 100 TABLET, FILM COATED ORAL at 05:02

## 2025-03-19 RX ADMIN — TICAGRELOR 90 MILLIGRAM(S): 90 TABLET ORAL at 05:01

## 2025-03-19 RX ADMIN — Medication 20 MILLIEQUIVALENT(S): at 06:54

## 2025-03-19 RX ADMIN — Medication 5 MILLIGRAM(S): at 00:26

## 2025-03-19 RX ADMIN — Medication 20 MILLIEQUIVALENT(S): at 09:45

## 2025-03-19 RX ADMIN — FINASTERIDE 5 MILLIGRAM(S): 1 TABLET, FILM COATED ORAL at 12:06

## 2025-03-19 NOTE — PHYSICAL THERAPY INITIAL EVALUATION ADULT - ADDITIONAL COMMENTS
Pt. lives with wife in a private home with 6 steps to enter and no stairs inside. Reports occasional use of SC PRN 2* to B knee OA. Independent at baseline.

## 2025-03-19 NOTE — OCCUPATIONAL THERAPY INITIAL EVALUATION ADULT - PERTINENT HX OF CURRENT PROBLEM, REHAB EVAL
The patient is a 70-year-old male with a past medical history of DM, HTN, HLD, and a pituitary adenoma who initially presented 1/17/2025 after having experienced transient right-sided weakness. 1/22/2025 ultrasound of the carotid arteries showed >70% right carotid stenosis with a bulky plaque visualized within the carotid bulb. The patient had not taken Plavix x 5 days and so was loaded with 180mg Brilinta PO pre procedure and Aspirin 81mg PO. The patient had no acute complaints on exam. The patient presents for a planned diagnostic cerebral angiogram + right carotid angioplasty and stenting. NIHSS 0 pre and post-op.     The patient is now being admitted to NSICU s/p (POD# 0)  Diagnostic Cerebral Angiogram + Right Carotid Angioplasty and Stenting x1 for further management. Access site right radial artery CDI, w/o hematoma  and good perfusion.

## 2025-03-19 NOTE — OCCUPATIONAL THERAPY INITIAL EVALUATION ADULT - LOWER BODY DRESSING, PREVIOUS LEVEL OF FUNCTION, OT EVAL
independent unable to do socks baseline 2* to b/l knee injuries, reports not wearing socks/independent

## 2025-03-19 NOTE — PROGRESS NOTE ADULT - ASSESSMENT
ASSESSMENT:  70-year-old male with PMH DM, HLD, HTN and pituitary adenoma, FTH severe R carotid stenosis now  s/p elective DSA + R carotid angioplasty and stenting 3/18/25      PLAN:   NEURO: POD#1 DSA + R carotid angioplasty and stentingx1  - Neuro checks per protocol   - c/w DAPT Brilinta and ASA  - Lipitor 80mg PO daily   - stroke labs-P  - Neuro IR following   - Pain management: Tylenol PRN  Activity: [X] Increase as tolerated [] Bedrest [X] PT [X] OT [X] PMNR    PULM:  - Incentive spirometry 10x/hr while awake  - HOB > 45 degrees  - Keep SaO2 > 95%    CV: Hx HTN, HLD  - Keep -140  - Home BP meds: losartan 25mg Daily   - PRN Labetalol   - Telemetry monitoring  - Lipid profile- ldl 65  - Daily statin and tricor    RENAL:  - Strict I/Os, daily weights  - Keep normonatremic   - Keep Magnesium level > 2; Potassium > 4  - Monitor lytes, replete as needed  - Continue Flomax and  finasteride     GI:  Diet: dash/ccd diet  GI prophylaxis [x] not indicated [] PPI [] other:  Bowel regimen [X] Miralax [X] senna [] other:    ENDO: Hx DM  - Goal Serum glucose 120-180  - PkN9x-4.7, TSH-1.59  - SSI Ac/HS    HEME/ONC:  VTE prophylaxis: [X] SCDs [x] chemoprophylaxis    ID:  - Keep normothermic, avoid fevers    MISC:  PT/OT/SLP    CODE STATUS:  [X] Full Code [] DNR [] DNI [] Palliative/Comfort Care    DISPOSITION: [X] 3e stroke    Patient wished to leave but nsicu team explained to him that he would benefit from remains inpatient for likely just 1 more day for optimization s/p carotid stent placement. The patient insisted upon leaving, and the risks of leaving inpatient care were explained in full to him. He expressed understanding and signed out against medical advice

## 2025-03-19 NOTE — PROGRESS NOTE ADULT - SUBJECTIVE AND OBJECTIVE BOX
SUMMARY: HPI:  The patient is a 70-year-old male with a past medical history of DM, HTN, HLD, and a pituitary adenoma who initially presented 1/17/2025 after having experienced transient right-sided weakness. 1/22/2025 ultrasound of the carotid arteries showed >70% right carotid stenosis with a bulky plaque visualized within the carotid bulb. The patient had not taken Plavix x 5 days and so was loaded with 180mg Brilinta PO pre procedure and Aspirin 81mg PO. The patient had no acute complaints on exam. The patient presents for a planned diagnostic cerebral angiogram + right carotid angioplasty and stenting. NIHSS 0 pre and post-op.     The patient is now being admitted to NSICU s/p (POD# 0)  Diagnostic Cerebral Angiogram + Right Carotid Angioplasty and Stenting x1 for further management. Access site right radial artery CDI, w/o hematoma  and good perfusion.       ADMISSION SCORES:    NIHSS: 0    REVIEW OF SYSTEMS: All pertinent noted in HPI    VITALS: [X] Reviewed    IMAGING/DATA: [X] Reviewed    IVF FLUIDS/MEDICATIONS: [X] Reviewed    ALLERGIES: Allergies    No Known Allergies    Intolerances    DEVICES:   [] Restraints [x] PIVs [] ET tube [] central line [] PICC [x] arterial line [] Baez [] NGT/OGT     EXAMINATION:  General: No acute distress  HEENT: Anicteric sclerae  Cardiac: [id number]  Lungs: Clear  Abdomen: Soft, non-tender, +BS  Extremities: No c/c/e  Skin/Incision Site: Clean, dry and intact  Neurologic: Awake, alert, fully oriented, follows commands, PERRL, VFFtc, EOMI, face symmetric, tongue midline, no drift, full strength, no dysarthria or aphasia, no gaze preference or dysconjugate gaze, TRAN AG, no drift, full strength 5/5, no neglect, no dysmetria.              ICU Vital Signs Last 24 Hrs  T(C): 36.7 (19 Mar 2025 12:00), Max: 36.9 (18 Mar 2025 19:15)  T(F): 98.1 (19 Mar 2025 12:00), Max: 98.5 (18 Mar 2025 19:30)  HR: 70 (19 Mar 2025 13:00) (57 - 89)  BP: 128/79 (19 Mar 2025 13:00) (128/62 - 170/77)  BP(mean): 91 (19 Mar 2025 13:00) (91 - 98)  ABP: 162/60 (19 Mar 2025 08:15) (120/64 - 162/60)  ABP(mean): 94 (19 Mar 2025 08:15) (94 - 94)  RR: 18 (19 Mar 2025 13:00) (18 - 21)  SpO2: 96% (19 Mar 2025 13:00) (96% - 100%)            aspirin  chewable 81 milliGRAM(s) Oral daily  atorvastatin 80 milliGRAM(s) Oral at bedtime  dextrose 5%. 1000 milliLiter(s) (50 mL/Hr) IV Continuous <Continuous>  dextrose 5%. 1000 milliLiter(s) (100 mL/Hr) IV Continuous <Continuous>  dextrose 50% Injectable 25 Gram(s) IV Push once  dextrose 50% Injectable 12.5 Gram(s) IV Push once  dextrose 50% Injectable 25 Gram(s) IV Push once  dextrose Oral Gel 15 Gram(s) Oral once PRN  fenofibrate Tablet 145 milliGRAM(s) Oral daily  finasteride 5 milliGRAM(s) Oral daily  glucagon  Injectable 1 milliGRAM(s) IntraMuscular once  hydrALAZINE Injectable 5 milliGRAM(s) IV Push every 1 hour PRN  HYDROmorphone  Injectable 0.5 milliGRAM(s) IV Push every 10 minutes PRN  HYDROmorphone  Injectable 1 milliGRAM(s) IV Push every 10 minutes PRN  insulin lispro (ADMELOG) corrective regimen sliding scale   SubCutaneous three times a day before meals  labetalol Injectable 10 milliGRAM(s) IV Push every 4 hours PRN  losartan 25 milliGRAM(s) Oral daily  meperidine     Injectable 12.5 milliGRAM(s) IV Push every 10 minutes PRN  multivitamin 1 Tablet(s) Oral daily  ondansetron Injectable 4 milliGRAM(s) IV Push once PRN  polyethylene glycol 3350 17 Gram(s) Oral daily  potassium chloride  20 mEq/100 mL IVPB 20 milliEquivalent(s) IV Intermittent once  senna 2 Tablet(s) Oral at bedtime  tamsulosin 0.4 milliGRAM(s) Oral at bedtime  ticagrelor 90 milliGRAM(s) Oral two times a day      LABS:  Na: 139 (03-19 @ 04:55), 140 (03-18 @ 22:55)  K: 3.9 (03-19 @ 04:55), 3.7 (03-18 @ 22:55)  Cl: 104 (03-19 @ 04:55), 103 (03-18 @ 22:55)  CO2: 23 (03-19 @ 04:55), 23 (03-18 @ 22:55)  BUN: 18 (03-19 @ 04:55), 20 (03-18 @ 22:55)  Cr: 0.9 (03-19 @ 04:55), 0.9 (03-18 @ 22:55)  Glu: 164(03-19 @ 04:55), 154(03-18 @ 22:55)    Hgb: 12.0 (03-19 @ 04:55), 12.4 (03-18 @ 22:55), 13.2 (03-18 @ 12:00)  Hct: 35.1 (03-19 @ 04:55), 36.2 (03-18 @ 22:55), 39.5 (03-18 @ 12:00)  WBC: 11.02 (03-19 @ 04:55), 10.22 (03-18 @ 22:55), 11.11 (03-18 @ 12:00)  Plt: 228 (03-19 @ 04:55), 232 (03-18 @ 22:55), 229 (03-18 @ 12:00)    INR: 0.94 03-19-25 @ 04:55, 1.36 03-18-25 @ 22:55, 0.93 03-18-25 @ 12:00  PTT: 29.3 03-19-25 @ 04:55, 27.7 03-18-25 @ 22:55, 29.7 03-18-25 @ 12:00          LIVER FUNCTIONS - ( 19 Mar 2025 04:55 )  Alb: 4.0 g/dL / Pro: 6.0 g/dL / ALK PHOS: 41 U/L / ALT: 13 U/L / AST: 19 U/L / GGT: x

## 2025-03-19 NOTE — CHART NOTE - NSCHARTNOTEFT_GEN_A_CORE
Patient was being downgraded from neuro ICU care to neurovascular service for continued monitoring for hyperperfusion syndrome, BP monitoring while POD 1 s/p right carotid stent via right radial arteriotomy site.   NIHSS 0 during recovery period.   BP < 140/ 80, s/p hydralazine PRN this AM.   A-line D/c this AM.   Patient without complaints this morning.   Brilinta 90 mg PO BID Rx sent to [facility]- patient acknowledged importance to  medication on discharge and agreed to  Rx from [facility].   Educated on importance of medication compliance s/p right carotid stenting, risk for stent-thrombosis and ischemic infarct if medication noncompliant. Patient admits understanding of risks of medication non-compliance and agrees to continue DAPT as directed - ASA 81 mg QD and Brilinta 90 mg BID.   Educated on importance of continued neuroICU/ stroke unit monitoring for signs of hyperperfusion syndrome s/p carotid stenting. Educated on importance of BP control while in recovery from carotid recanalization procedure. Patient states he understands the risks but again wishes to leave against medical advice citing personal reasons/ obligations which he feels require his discharge at this time.   Patient understands that should he experience oozing from radial arteriotomy site, sudden onset severe headache w dizziness nausea vomiting, new weakness, sensory changes, visual changes, difficulty speaking, or any stroke-like symptom to return immediately to the emergency room for evaluation.     Patient understands counseling as above, continues to wish to leave against medical advice.   Patient signed AMA papers, agreed to go to [facility] on discharge from PACU to  Brilinta medication prior to leaving the hospital building.   Appointment with Dr Perez at [address] [place] [address] this Friday 3/21 at 11:45 am. Office aware.     Discussed with neuro ICU team and Dr Perez.   Dr Schwab aware.   [id number] Patient was being downgraded from neuro ICU care to neurovascular service for continued monitoring for hyperperfusion syndrome, BP monitoring while POD 1 s/p right carotid stent via right radial arteriotomy site.   NIHSS 0 during recovery period.   BP < 140/ 80, s/p hydralazine PRN this AM.   A-line D/c this AM.   Patient without complaints this morning.   Brilinta 90 mg PO BID Rx sent to [facility]- patient acknowledged importance to  medication on discharge and agreed to  Rx from [facility].   Educated on importance of medication compliance s/p right carotid stenting, risk for stent-thrombosis and ischemic infarct if medication noncompliant. Patient admits understanding of risks of medication non-compliance and agrees to continue DAPT as directed - ASA 81 mg QD and Brilinta 90 mg BID.   Educated on importance of continued neuroICU/ stroke unit monitoring for signs of hyperperfusion syndrome s/p carotid stenting. Educated on importance of BP control while in recovery from carotid recanalization procedure. Patient states he understands the risks but again wishes to leave against medical advice citing personal reasons/ obligations which he feels require his discharge at this time.   Patient understands that should he experience oozing from radial arteriotomy site, sudden onset severe headache w dizziness nausea vomiting, new weakness, sensory changes, visual changes, difficulty speaking, or any stroke-like symptom to return immediately to the emergency room for evaluation.     Patient understands counseling as above, continues to wish to leave against medical advice.   Patient signed AMA papers, agreed to go to [facility] on discharge from PACU to  Brilinta medication prior to leaving the hospital building.   Appointment with Dr Perez at [address] [place] [address] this Friday 3/21 at 11:45 am. Office aware.     Patient physically walked to [facility] and witnessed picking up Brilinta Rx.     Discussed with neuro ICU team and Dr Perez.   Dr Schwab aware.   [id number]

## 2025-03-19 NOTE — PHARMACOTHERAPY INTERVENTION NOTE - COMMENTS
Messaged NP regarding frequency of hydralazine. As per [facility], frequency is usually every 4 hours prn. NP is aware and would like to continue with medication every 1 hour prn

## 2025-03-19 NOTE — OCCUPATIONAL THERAPY INITIAL EVALUATION ADULT - GENERAL OBSERVATIONS, REHAB EVAL
pt encountered semi neely in bed in NAD, pt agreed to OT session, +tele, +BP cuff, +pulse ox, pt bp increase discussed with MD permission to continue laying (183/74), (161/66) sitting EOB (123/56)(133/60), standing (126/60) sitting b/s chair (146/65) pt left in b/s chair RN notified

## 2025-03-19 NOTE — CHART NOTE - NSCHARTNOTEFT_GEN_A_CORE
Patient was seen and examined at the bedside this AM. He verbalized the need to leave today AMA due to his wifes broken tooth. Upon exam, he is AOx3, non focal neurological exam, and cognitively intact. Patient was educated on the need to remain under the care of the neurology service in the hospital due to the risk of hyperperfusion syndrome s/p carotid stenting. He was also educated on importance of BP control while in recovery from carotid recanalization procedure. Patient states he understands the risks but again wishes to leave against medical advice citing personal reasons above. Patient also verbalized the understanding that should he experience oozing from radial arteriotomy site, sudden onset severe headache w dizziness nausea vomiting, new weakness, sensory changes, visual changes, difficulty speaking, or any stroke-like symptom to return immediately to the emergency room for evaluation. Upon verbalization of all the above education, patient signed the AMA paperwork and proceeded to go to [facility] for his Brilinta. His follow up Appointment with Dr Perez at [address] [place] [address] this Friday 3/21 at 11:45 am. Office aware.     Discussed with Dr Schwab and NeuroIR team.

## 2025-03-19 NOTE — PROGRESS NOTE ADULT - SUBJECTIVE AND OBJECTIVE BOX
Neuroendovascular Progress Note:     HPI:  The patient is a 70-year-old male with a past medical history of DM, HTN, HLD, and a pituitary adenoma who initially presented 1/17/2025 after having experienced transient right-sided weakness. 1/22/2025 ultrasound of the carotid arteries showed >70% right carotid stenosis with a bulky plaque visualized within the carotid bulb. The patient had not taken Plavix x 5 days and so was loaded with 180mg Brilinta PO pre procedure and Aspirin 81mg PO. The patient has no acute complaints on exam. NIHSS 0. NPO except medication after midnight last night. The patient presents for a planned diagnostic cerebral angiogram + right carotid angioplasty and stenting. (18 Mar 2025 13:37)    Patient is POD 1 s/p diagnostic cerebral angiogram and right carotid angioplasty and stenting. Right radial arteriotomy site CDI without evidence of bleeding hematoma or infection. Pulses intact. NIHSS0. BP elevated this AM requiring hydralazine PRN 5mg IVP.       Past medical history:   No pertinent past medical history    HTN (hypertension)    HLD (hyperlipidemia)    DM (diabetes mellitus)    Obese    ALECIA (obstructive sleep apnea)        Medications:   aspirin  chewable 81 milliGRAM(s) Oral daily  aspirin enteric coated 81 milliGRAM(s) Oral daily  atorvastatin 80 milliGRAM(s) Oral at bedtime  dextrose 5%. 1000 milliLiter(s) IV Continuous <Continuous>  dextrose 5%. 1000 milliLiter(s) IV Continuous <Continuous>  dextrose 50% Injectable 25 Gram(s) IV Push once  dextrose 50% Injectable 12.5 Gram(s) IV Push once  dextrose 50% Injectable 25 Gram(s) IV Push once  fenofibrate Tablet 145 milliGRAM(s) Oral daily  finasteride 5 milliGRAM(s) Oral daily  glucagon  Injectable 1 milliGRAM(s) IntraMuscular once  insulin lispro (ADMELOG) corrective regimen sliding scale   SubCutaneous three times a day before meals  losartan 25 milliGRAM(s) Oral daily  multivitamin 1 Tablet(s) Oral daily  polyethylene glycol 3350 17 Gram(s) Oral daily  potassium chloride  20 mEq/100 mL IVPB 20 milliEquivalent(s) IV Intermittent once  senna 2 Tablet(s) Oral at bedtime  sodium chloride 0.9%. 1000 milliLiter(s) IV Continuous <Continuous>  tamsulosin 0.4 milliGRAM(s) Oral at bedtime  ticagrelor 90 milliGRAM(s) Oral two times a day      Vitals:   T(F): 98.2 (03-19-25 @ 08:15), Max: 98.5 (03-18-25 @ 19:30)  HR: 77 (03-19-25 @ 08:45) (57 - 89)  BP: 137/63 (03-19-25 @ 08:45) (128/62 - 170/77)  RR: 18 (03-19-25 @ 08:45) (18 - 21)  SpO2: 98% (03-19-25 @ 08:45) (96% - 100%)    Labs:                         12.0   11.02 )-----------( 228      ( 19 Mar 2025 04:55 )             35.1     03-19    139  |  104  |  18  ----------------------------<  164[H]  3.9   |  23  |  0.9    Ca    9.4      19 Mar 2025 04:55  Phos  3.2     03-19  Mg     1.6     03-18    TPro  6.0  /  Alb  4.0  /  TBili  0.5  /  DBili  x   /  AST  19  /  ALT  13  /  AlkPhos  41  03-19    PT/INR - ( 19 Mar 2025 04:55 )   PT: 11.10 sec;   INR: 0.94 ratio         PTT - ( 19 Mar 2025 04:55 )  PTT:29.3 sec  LIVER FUNCTIONS - ( 19 Mar 2025 04:55 )  Alb: 4.0 g/dL / Pro: 6.0 g/dL / ALK PHOS: 41 U/L / ALT: 13 U/L / AST: 19 U/L / GGT: x             Exam:   General - NAD   Neuro - AAO3, follows commands, EOMi, visual fields intact, face symmetric, moves all extremities to antigravity, sensation intact, no neglect, no aphasia, no dysarthria, no dysmetria on finger to nose.   Extremity - right radial arteriotomy site CDI without bleeding hematoma or infection. Pulses intact. Nontender to palpation.     Radiology:   Imaging reviewed per neuroendovascular ACP/attending.   IR neuro report pending.     Assessment:   70 year old male, history DM HTN HLD pituitary adenoma, presented w transient right sided weakness, found with bilateral carotid stenosis, R>L, with right carotid reported >70% stenosis. Patient was started on aspirin and plavix, however was told by PST to not take either med 5d prior to procedure, so was loaded on brilinta and started on 90 mg BID, S/P aspirin dose prior to procedure. S/p right carotid angioplasty and stenting. Continues on DAPT. Right radial arteriotomy site CDI. S/p hydralazine this AM for BP.     Suggestions:  Continue DAPT - aspirin 81 mg and brilinta 90 mg BID - Rx sent to [facility] to check copay   Continue monitoring right radial arteriotomy site for signs of bleeding or hematoma, intact pulses   -140 post procedure, continue home meds   Continue monitoring post procedure, care collaboration with neuro ICU team   OT no skilled needs   PT eval pending   Dispo pending ICU clearance and PT recs   Discussed w ICU and Dr Perez   [id number] neuroendovascular  Neuroendovascular Progress Note:     HPI:  The patient is a 70-year-old male with a past medical history of DM, HTN, HLD, and a pituitary adenoma who initially presented 1/17/2025 after having experienced transient right-sided weakness. 1/22/2025 ultrasound of the carotid arteries showed >70% right carotid stenosis with a bulky plaque visualized within the carotid bulb. The patient had not taken Plavix x 5 days and so was loaded with 180mg Brilinta PO pre procedure and Aspirin 81mg PO. The patient has no acute complaints on exam. NIHSS 0. NPO except medication after midnight last night. The patient presents for a planned diagnostic cerebral angiogram + right carotid angioplasty and stenting. (18 Mar 2025 13:37)    Patient is POD 1 s/p diagnostic cerebral angiogram and right carotid angioplasty and stenting. Right radial arteriotomy site CDI without evidence of bleeding hematoma or infection. Pulses intact. NIHSS0. BP elevated this AM requiring hydralazine PRN 5mg IVP.       Past medical history:   No pertinent past medical history    HTN (hypertension)    HLD (hyperlipidemia)    DM (diabetes mellitus)    Obese    ALECIA (obstructive sleep apnea)        Medications:   aspirin  chewable 81 milliGRAM(s) Oral daily  aspirin enteric coated 81 milliGRAM(s) Oral daily  atorvastatin 80 milliGRAM(s) Oral at bedtime  dextrose 5%. 1000 milliLiter(s) IV Continuous <Continuous>  dextrose 5%. 1000 milliLiter(s) IV Continuous <Continuous>  dextrose 50% Injectable 25 Gram(s) IV Push once  dextrose 50% Injectable 12.5 Gram(s) IV Push once  dextrose 50% Injectable 25 Gram(s) IV Push once  fenofibrate Tablet 145 milliGRAM(s) Oral daily  finasteride 5 milliGRAM(s) Oral daily  glucagon  Injectable 1 milliGRAM(s) IntraMuscular once  insulin lispro (ADMELOG) corrective regimen sliding scale   SubCutaneous three times a day before meals  losartan 25 milliGRAM(s) Oral daily  multivitamin 1 Tablet(s) Oral daily  polyethylene glycol 3350 17 Gram(s) Oral daily  potassium chloride  20 mEq/100 mL IVPB 20 milliEquivalent(s) IV Intermittent once  senna 2 Tablet(s) Oral at bedtime  sodium chloride 0.9%. 1000 milliLiter(s) IV Continuous <Continuous>  tamsulosin 0.4 milliGRAM(s) Oral at bedtime  ticagrelor 90 milliGRAM(s) Oral two times a day      Vitals:   T(F): 98.2 (03-19-25 @ 08:15), Max: 98.5 (03-18-25 @ 19:30)  HR: 77 (03-19-25 @ 08:45) (57 - 89)  BP: 137/63 (03-19-25 @ 08:45) (128/62 - 170/77)  RR: 18 (03-19-25 @ 08:45) (18 - 21)  SpO2: 98% (03-19-25 @ 08:45) (96% - 100%)    Labs:                         12.0   11.02 )-----------( 228      ( 19 Mar 2025 04:55 )             35.1     03-19    139  |  104  |  18  ----------------------------<  164[H]  3.9   |  23  |  0.9    Ca    9.4      19 Mar 2025 04:55  Phos  3.2     03-19  Mg     1.6     03-18    TPro  6.0  /  Alb  4.0  /  TBili  0.5  /  DBili  x   /  AST  19  /  ALT  13  /  AlkPhos  41  03-19    PT/INR - ( 19 Mar 2025 04:55 )   PT: 11.10 sec;   INR: 0.94 ratio         PTT - ( 19 Mar 2025 04:55 )  PTT:29.3 sec  LIVER FUNCTIONS - ( 19 Mar 2025 04:55 )  Alb: 4.0 g/dL / Pro: 6.0 g/dL / ALK PHOS: 41 U/L / ALT: 13 U/L / AST: 19 U/L / GGT: x             Exam:   General - NAD   Neuro - AAO3, follows commands, EOMi, visual fields intact, moves all extremities to antigravity, sensation intact, no neglect, no aphasia, no dysarthria, no dysmetria on finger to nose.   Extremity - right radial arteriotomy site CDI without bleeding hematoma or infection. Pulses intact. Nontender to palpation.     Radiology:   Imaging reviewed per neuroendovascular ACP/attending.   IR neuro report pending.     Assessment:   70 year old male, history DM HTN HLD pituitary adenoma, presented w transient right sided weakness, found with bilateral carotid stenosis, R>L, with right carotid reported >70% stenosis. Patient was started on aspirin and plavix, however was told by PST to not take either med 5d prior to procedure, so was loaded on brilinta and started on 90 mg BID, S/P aspirin dose prior to procedure. S/p right carotid angioplasty and stenting. Continues on DAPT. Right radial arteriotomy site CDI. S/p hydralazine this AM for BP.     Suggestions:  Continue DAPT - aspirin 81 mg and brilinta 90 mg BID - Rx sent to [facility] to check copay   Continue monitoring right radial arteriotomy site for signs of bleeding or hematoma, intact pulses   -140 post procedure, continue home meds   Continue monitoring post procedure, care collaboration with neuro ICU team   OT no skilled needs   PT eval pending   Dispo pending ICU clearance and PT recs   Discussed w ICU and Dr Perez   [id number] neuroendovascular  Neuroendovascular Progress Note:     HPI:  The patient is a 70-year-old male with a past medical history of DM, HTN, HLD, and a pituitary adenoma who initially presented 1/17/2025 after having experienced transient right-sided weakness. 1/22/2025 ultrasound of the carotid arteries showed >70% right carotid stenosis with a bulky plaque visualized within the carotid bulb. The patient had not taken Plavix x 5 days and so was loaded with 180mg Brilinta PO pre procedure and Aspirin 81mg PO. The patient has no acute complaints on exam. NIHSS 0. NPO except medication after midnight last night. The patient presents for a planned diagnostic cerebral angiogram + right carotid angioplasty and stenting. (18 Mar 2025 13:37)    Patient is POD 1 s/p diagnostic cerebral angiogram and right carotid angioplasty and stenting. Right radial arteriotomy site CDI without evidence of bleeding hematoma or infection. Pulses intact. BP elevated this AM requiring hydralazine PRN 5mg IVP.       Past medical history:   No pertinent past medical history    HTN (hypertension)    HLD (hyperlipidemia)    DM (diabetes mellitus)    Obese    ALECIA (obstructive sleep apnea)        Medications:   aspirin  chewable 81 milliGRAM(s) Oral daily  aspirin enteric coated 81 milliGRAM(s) Oral daily  atorvastatin 80 milliGRAM(s) Oral at bedtime  dextrose 5%. 1000 milliLiter(s) IV Continuous <Continuous>  dextrose 5%. 1000 milliLiter(s) IV Continuous <Continuous>  dextrose 50% Injectable 25 Gram(s) IV Push once  dextrose 50% Injectable 12.5 Gram(s) IV Push once  dextrose 50% Injectable 25 Gram(s) IV Push once  fenofibrate Tablet 145 milliGRAM(s) Oral daily  finasteride 5 milliGRAM(s) Oral daily  glucagon  Injectable 1 milliGRAM(s) IntraMuscular once  insulin lispro (ADMELOG) corrective regimen sliding scale   SubCutaneous three times a day before meals  losartan 25 milliGRAM(s) Oral daily  multivitamin 1 Tablet(s) Oral daily  polyethylene glycol 3350 17 Gram(s) Oral daily  potassium chloride  20 mEq/100 mL IVPB 20 milliEquivalent(s) IV Intermittent once  senna 2 Tablet(s) Oral at bedtime  sodium chloride 0.9%. 1000 milliLiter(s) IV Continuous <Continuous>  tamsulosin 0.4 milliGRAM(s) Oral at bedtime  ticagrelor 90 milliGRAM(s) Oral two times a day      Vitals:   T(F): 98.2 (03-19-25 @ 08:15), Max: 98.5 (03-18-25 @ 19:30)  HR: 77 (03-19-25 @ 08:45) (57 - 89)  BP: 137/63 (03-19-25 @ 08:45) (128/62 - 170/77)  RR: 18 (03-19-25 @ 08:45) (18 - 21)  SpO2: 98% (03-19-25 @ 08:45) (96% - 100%)    Labs:                         12.0   11.02 )-----------( 228      ( 19 Mar 2025 04:55 )             35.1     03-19    139  |  104  |  18  ----------------------------<  164[H]  3.9   |  23  |  0.9    Ca    9.4      19 Mar 2025 04:55  Phos  3.2     03-19  Mg     1.6     03-18    TPro  6.0  /  Alb  4.0  /  TBili  0.5  /  DBili  x   /  AST  19  /  ALT  13  /  AlkPhos  41  03-19    PT/INR - ( 19 Mar 2025 04:55 )   PT: 11.10 sec;   INR: 0.94 ratio         PTT - ( 19 Mar 2025 04:55 )  PTT:29.3 sec  LIVER FUNCTIONS - ( 19 Mar 2025 04:55 )  Alb: 4.0 g/dL / Pro: 6.0 g/dL / ALK PHOS: 41 U/L / ALT: 13 U/L / AST: 19 U/L / GGT: x             Exam:   General - NAD   Neuro - AAO3, follows commands, EOMi, visual fields intact, moves all extremities to antigravity, sensation intact, no neglect, no aphasia, no dysarthria, no dysmetria on finger to nose.   Extremity - right radial arteriotomy site CDI without bleeding hematoma or infection. Pulses intact. Nontender to palpation.     Radiology:   Imaging reviewed per neuroendovascular ACP/attending.   IR neuro report pending.     Assessment:   70 year old male, history DM HTN HLD pituitary adenoma, presented w transient right sided weakness, found with bilateral carotid stenosis, R>L, with right carotid reported >70% stenosis. Patient was started on aspirin and plavix, however was told by PST to not take either med 5d prior to procedure, so was loaded on brilinta and started on 90 mg BID, S/P aspirin dose prior to procedure. S/p right carotid angioplasty and stenting. Continues on DAPT. Right radial arteriotomy site CDI. S/p hydralazine this AM for BP.     Suggestions:  Continue DAPT - aspirin 81 mg and brilinta 90 mg BID - Rx sent to [facility] to check copay   Continue monitoring right radial arteriotomy site for signs of bleeding or hematoma, intact pulses   -140 post procedure, continue home meds   Continue monitoring post procedure, care collaboration with neuro ICU team   OT no skilled needs   PT no skilled needs, OP PT   Dispo pending ICU clearance   Discussed w ICU and Dr Perez   [id number] neuroendovascular

## 2025-03-19 NOTE — PHYSICAL THERAPY INITIAL EVALUATION ADULT - GENERAL OBSERVATIONS, REHAB EVAL
10:05-10:30 Pt. encountered in semifowler in bed in NAD. +Tele, +Pulse ox, +BP cuff. Agreeable to PT. SBP noted to be outside of set parameters at end of session (155 mmHg). Raymond EMMANUEL made aware. Venita ARREDONDO aware.

## 2025-03-19 NOTE — OCCUPATIONAL THERAPY INITIAL EVALUATION ADULT - NSACTIVITYREC_GEN_A_OT
pt educated on no lifting more than 10 lbs, no excessively hot/cold showers, no straining wrist when opening objects pt educated on no lifting more than 10 lbs, no excessively hot/cold showers, no straining wrist when opening objects with R wrist for 7 days post procedure. good understanding of all education

## 2025-03-20 LAB — CA-I BLD-SCNC: 5.4 MG/DL — SIGNIFICANT CHANGE UP (ref 4.5–5.6)

## 2025-03-21 ENCOUNTER — APPOINTMENT (OUTPATIENT)
Dept: NEUROSURGERY | Facility: CLINIC | Age: 70
End: 2025-03-21
Payer: MEDICARE

## 2025-03-21 ENCOUNTER — NON-APPOINTMENT (OUTPATIENT)
Age: 70
End: 2025-03-21

## 2025-03-21 VITALS
BODY MASS INDEX: 41.58 KG/M2 | DIASTOLIC BLOOD PRESSURE: 76 MMHG | SYSTOLIC BLOOD PRESSURE: 123 MMHG | HEIGHT: 71 IN | WEIGHT: 297 LBS

## 2025-03-21 DIAGNOSIS — Z95.828 PRESENCE OF OTHER VASCULAR IMPLANTS AND GRAFTS: ICD-10-CM

## 2025-03-21 PROCEDURE — 99024 POSTOP FOLLOW-UP VISIT: CPT

## 2025-04-02 ENCOUNTER — APPOINTMENT (OUTPATIENT)
Facility: CLINIC | Age: 70
End: 2025-04-02
Payer: MEDICARE

## 2025-04-02 ENCOUNTER — NON-APPOINTMENT (OUTPATIENT)
Age: 70
End: 2025-04-02

## 2025-04-02 PROCEDURE — 92134 CPTRZ OPH DX IMG PST SGM RTA: CPT

## 2025-04-02 PROCEDURE — 99024 POSTOP FOLLOW-UP VISIT: CPT

## 2025-04-02 PROCEDURE — 92202 OPSCPY EXTND ON/MAC DRAW: CPT

## 2025-04-08 ENCOUNTER — APPOINTMENT (OUTPATIENT)
Dept: NEUROLOGY | Facility: CLINIC | Age: 70
End: 2025-04-08

## 2025-04-15 RX ORDER — TICAGRELOR 90 MG/1
90 TABLET, FILM COATED ORAL TWICE DAILY
Qty: 60 | Refills: 3 | Status: ACTIVE | COMMUNITY
Start: 2025-04-15 | End: 1900-01-01

## 2025-04-22 ENCOUNTER — OUTPATIENT (OUTPATIENT)
Dept: OUTPATIENT SERVICES | Facility: HOSPITAL | Age: 70
LOS: 1 days | End: 2025-04-22
Payer: MEDICARE

## 2025-04-22 ENCOUNTER — RESULT REVIEW (OUTPATIENT)
Age: 70
End: 2025-04-22

## 2025-04-22 DIAGNOSIS — N28.1 CYST OF KIDNEY, ACQUIRED: Chronic | ICD-10-CM

## 2025-04-22 DIAGNOSIS — Z95.828 PRESENCE OF OTHER VASCULAR IMPLANTS AND GRAFTS: ICD-10-CM

## 2025-04-22 DIAGNOSIS — Z90.49 ACQUIRED ABSENCE OF OTHER SPECIFIED PARTS OF DIGESTIVE TRACT: Chronic | ICD-10-CM

## 2025-04-22 DIAGNOSIS — Z00.8 ENCOUNTER FOR OTHER GENERAL EXAMINATION: ICD-10-CM

## 2025-04-22 PROCEDURE — 93880 EXTRACRANIAL BILAT STUDY: CPT

## 2025-04-22 PROCEDURE — 93880 EXTRACRANIAL BILAT STUDY: CPT | Mod: 26

## 2025-04-23 ENCOUNTER — NON-APPOINTMENT (OUTPATIENT)
Age: 70
End: 2025-04-23

## 2025-04-23 DIAGNOSIS — Z95.828 PRESENCE OF OTHER VASCULAR IMPLANTS AND GRAFTS: ICD-10-CM

## 2025-04-25 ENCOUNTER — APPOINTMENT (OUTPATIENT)
Dept: NEUROSURGERY | Facility: CLINIC | Age: 70
End: 2025-04-25
Payer: MEDICARE

## 2025-04-25 PROCEDURE — 99024 POSTOP FOLLOW-UP VISIT: CPT

## 2025-05-01 ENCOUNTER — APPOINTMENT (OUTPATIENT)
Dept: ORTHOPEDIC SURGERY | Facility: CLINIC | Age: 70
End: 2025-05-01

## 2025-08-15 ENCOUNTER — NON-APPOINTMENT (OUTPATIENT)
Age: 70
End: 2025-08-15

## 2025-08-15 ENCOUNTER — APPOINTMENT (OUTPATIENT)
Facility: CLINIC | Age: 70
End: 2025-08-15

## 2025-08-15 PROCEDURE — 67028 INJECTION EYE DRUG: CPT | Mod: RT

## 2025-08-15 PROCEDURE — 92134 CPTRZ OPH DX IMG PST SGM RTA: CPT

## 2025-08-15 PROCEDURE — 92014 COMPRE OPH EXAM EST PT 1/>: CPT | Mod: 25

## 2025-09-18 ENCOUNTER — APPOINTMENT (OUTPATIENT)
Dept: ENDOCRINOLOGY | Facility: CLINIC | Age: 70
End: 2025-09-18
Payer: MEDICARE

## 2025-09-18 VITALS
BODY MASS INDEX: 41.58 KG/M2 | HEART RATE: 99 BPM | HEIGHT: 71 IN | OXYGEN SATURATION: 98 % | SYSTOLIC BLOOD PRESSURE: 124 MMHG | WEIGHT: 297 LBS | RESPIRATION RATE: 18 BRPM | DIASTOLIC BLOOD PRESSURE: 74 MMHG

## 2025-09-18 DIAGNOSIS — Z83.3 FAMILY HISTORY OF DIABETES MELLITUS: ICD-10-CM

## 2025-09-18 DIAGNOSIS — Z86.69 PERSONAL HISTORY OF OTHER DISEASES OF THE NERVOUS SYSTEM AND SENSE ORGANS: ICD-10-CM

## 2025-09-18 DIAGNOSIS — D35.2 BENIGN NEOPLASM OF PITUITARY GLAND: ICD-10-CM

## 2025-09-18 DIAGNOSIS — E66.01 MORBID (SEVERE) OBESITY DUE TO EXCESS CALORIES: ICD-10-CM

## 2025-09-18 DIAGNOSIS — E11.9 TYPE 2 DIABETES MELLITUS W/OUT COMPLICATIONS: ICD-10-CM

## 2025-09-18 DIAGNOSIS — I10 ESSENTIAL (PRIMARY) HYPERTENSION: ICD-10-CM

## 2025-09-18 DIAGNOSIS — Z78.9 OTHER SPECIFIED HEALTH STATUS: ICD-10-CM

## 2025-09-18 DIAGNOSIS — Z82.49 FAMILY HISTORY OF ISCHEMIC HEART DISEASE AND OTHER DISEASES OF THE CIRCULATORY SYSTEM: ICD-10-CM

## 2025-09-18 DIAGNOSIS — E78.5 HYPERLIPIDEMIA, UNSPECIFIED: ICD-10-CM

## 2025-09-18 DIAGNOSIS — E11.42 TYPE 2 DIABETES MELLITUS WITH DIABETIC POLYNEUROPATHY: ICD-10-CM

## 2025-09-18 PROCEDURE — 99204 OFFICE O/P NEW MOD 45 MIN: CPT

## 2025-09-18 RX ORDER — GLIPIZIDE 5 MG/1
5 TABLET, EXTENDED RELEASE ORAL
Refills: 0 | Status: ACTIVE | COMMUNITY

## 2025-09-18 RX ORDER — AMLODIPINE BESYLATE 10 MG/1
10 TABLET ORAL
Refills: 0 | Status: ACTIVE | COMMUNITY

## 2025-09-18 RX ORDER — SEMAGLUTIDE 1.34 MG/ML
4 INJECTION, SOLUTION SUBCUTANEOUS
Refills: 0 | Status: ACTIVE | COMMUNITY